# Patient Record
Sex: MALE | Race: WHITE | NOT HISPANIC OR LATINO | Employment: OTHER | URBAN - METROPOLITAN AREA
[De-identification: names, ages, dates, MRNs, and addresses within clinical notes are randomized per-mention and may not be internally consistent; named-entity substitution may affect disease eponyms.]

---

## 2019-01-22 ENCOUNTER — AMBULATORY - HEALTHEAST (OUTPATIENT)
Dept: ADMINISTRATIVE | Facility: CLINIC | Age: 74
End: 2019-01-22

## 2019-01-22 RX ORDER — ATORVASTATIN CALCIUM 40 MG/1
40 TABLET, FILM COATED ORAL DAILY
Status: SHIPPED | COMMUNITY
Start: 2021-06-27

## 2019-01-22 RX ORDER — METOPROLOL SUCCINATE 25 MG/1
12.5 TABLET, EXTENDED RELEASE ORAL DAILY
Status: SHIPPED | COMMUNITY
Start: 2019-01-22

## 2019-01-22 RX ORDER — CLOPIDOGREL BISULFATE 75 MG/1
75 TABLET ORAL DAILY
Status: SHIPPED | COMMUNITY
Start: 2019-01-22 | End: 2021-07-16

## 2019-01-22 RX ORDER — NITROGLYCERIN 0.4 MG/1
0.4 TABLET SUBLINGUAL EVERY 5 MIN PRN
Status: SHIPPED | COMMUNITY
Start: 2021-06-26

## 2019-01-23 ENCOUNTER — OFFICE VISIT - HEALTHEAST (OUTPATIENT)
Dept: GERIATRICS | Facility: CLINIC | Age: 74
End: 2019-01-23

## 2019-01-23 DIAGNOSIS — Z96.612 STATUS POST REVERSE ARTHROPLASTY OF LEFT SHOULDER: ICD-10-CM

## 2019-01-23 DIAGNOSIS — D64.9 ANEMIA, UNSPECIFIED TYPE: ICD-10-CM

## 2019-01-23 DIAGNOSIS — I25.118 CORONARY ARTERY DISEASE OF NATIVE ARTERY OF NATIVE HEART WITH STABLE ANGINA PECTORIS (H): ICD-10-CM

## 2019-01-23 DIAGNOSIS — I50.21 ACUTE SYSTOLIC CONGESTIVE HEART FAILURE (H): ICD-10-CM

## 2019-01-24 ENCOUNTER — RECORDS - HEALTHEAST (OUTPATIENT)
Dept: LAB | Facility: CLINIC | Age: 74
End: 2019-01-24

## 2019-01-25 LAB
ANION GAP SERPL CALCULATED.3IONS-SCNC: 11 MMOL/L (ref 5–18)
BUN SERPL-MCNC: 16 MG/DL (ref 8–28)
CALCIUM SERPL-MCNC: 8.9 MG/DL (ref 8.5–10.5)
CHLORIDE BLD-SCNC: 102 MMOL/L (ref 98–107)
CO2 SERPL-SCNC: 24 MMOL/L (ref 22–31)
CREAT SERPL-MCNC: 0.69 MG/DL (ref 0.7–1.3)
ERYTHROCYTE [DISTWIDTH] IN BLOOD BY AUTOMATED COUNT: 14.2 % (ref 11–14.5)
GFR SERPL CREATININE-BSD FRML MDRD: >60 ML/MIN/1.73M2
GLUCOSE BLD-MCNC: 99 MG/DL (ref 70–125)
HCT VFR BLD AUTO: 33.5 % (ref 40–54)
HGB BLD-MCNC: 10.7 G/DL (ref 14–18)
MAGNESIUM SERPL-MCNC: 2.4 MG/DL (ref 1.8–2.6)
MCH RBC QN AUTO: 32 PG (ref 27–34)
MCHC RBC AUTO-ENTMCNC: 31.9 G/DL (ref 32–36)
MCV RBC AUTO: 100 FL (ref 80–100)
PLATELET # BLD AUTO: 338 THOU/UL (ref 140–440)
PMV BLD AUTO: 9.9 FL (ref 8.5–12.5)
POTASSIUM BLD-SCNC: 4 MMOL/L (ref 3.5–5)
RBC # BLD AUTO: 3.34 MILL/UL (ref 4.4–6.2)
SODIUM SERPL-SCNC: 137 MMOL/L (ref 136–145)
WBC: 6.7 THOU/UL (ref 4–11)

## 2019-01-28 ENCOUNTER — OFFICE VISIT - HEALTHEAST (OUTPATIENT)
Dept: GERIATRICS | Facility: CLINIC | Age: 74
End: 2019-01-28

## 2019-01-28 DIAGNOSIS — R52 PAIN: ICD-10-CM

## 2019-01-28 DIAGNOSIS — I21.19 ACUTE ST ELEVATION MYOCARDIAL INFARCTION (STEMI) OF INFERIOR WALL (H): ICD-10-CM

## 2019-01-28 DIAGNOSIS — I50.21 ACUTE SYSTOLIC HEART FAILURE (H): ICD-10-CM

## 2019-01-28 DIAGNOSIS — D50.9 IRON DEFICIENCY ANEMIA, UNSPECIFIED IRON DEFICIENCY ANEMIA TYPE: ICD-10-CM

## 2019-01-28 DIAGNOSIS — Z79.01 ANTICOAGULATION ADEQUATE: ICD-10-CM

## 2019-01-28 DIAGNOSIS — I89.0 LYMPHEDEMA: ICD-10-CM

## 2019-01-28 DIAGNOSIS — S42.202D CLOSED FRACTURE OF PROXIMAL END OF LEFT HUMERUS WITH ROUTINE HEALING, UNSPECIFIED FRACTURE MORPHOLOGY, SUBSEQUENT ENCOUNTER: ICD-10-CM

## 2019-01-28 DIAGNOSIS — M06.9 RHEUMATOID ARTHRITIS INVOLVING MULTIPLE SITES, UNSPECIFIED RHEUMATOID FACTOR PRESENCE: ICD-10-CM

## 2019-02-01 ENCOUNTER — RECORDS - HEALTHEAST (OUTPATIENT)
Dept: ADMINISTRATIVE | Facility: OTHER | Age: 74
End: 2019-02-01

## 2019-02-05 ENCOUNTER — OFFICE VISIT - HEALTHEAST (OUTPATIENT)
Dept: GERIATRICS | Facility: CLINIC | Age: 74
End: 2019-02-05

## 2019-02-05 DIAGNOSIS — R52 PAIN: ICD-10-CM

## 2019-02-05 DIAGNOSIS — I50.21 ACUTE SYSTOLIC HEART FAILURE (H): ICD-10-CM

## 2019-02-05 DIAGNOSIS — Z79.01 ANTICOAGULATION ADEQUATE: ICD-10-CM

## 2019-02-05 DIAGNOSIS — I89.0 LYMPHEDEMA: ICD-10-CM

## 2019-02-05 DIAGNOSIS — I21.19 ACUTE ST ELEVATION MYOCARDIAL INFARCTION (STEMI) OF INFERIOR WALL (H): ICD-10-CM

## 2019-02-05 DIAGNOSIS — S42.202D CLOSED FRACTURE OF PROXIMAL END OF LEFT HUMERUS WITH ROUTINE HEALING, UNSPECIFIED FRACTURE MORPHOLOGY, SUBSEQUENT ENCOUNTER: ICD-10-CM

## 2019-02-05 DIAGNOSIS — M06.9 RHEUMATOID ARTHRITIS INVOLVING MULTIPLE SITES, UNSPECIFIED RHEUMATOID FACTOR PRESENCE: ICD-10-CM

## 2019-02-05 DIAGNOSIS — D50.9 IRON DEFICIENCY ANEMIA, UNSPECIFIED IRON DEFICIENCY ANEMIA TYPE: ICD-10-CM

## 2019-02-05 RX ORDER — ACETAMINOPHEN 325 MG/1
650 TABLET ORAL 4 TIMES DAILY
Status: SHIPPED | COMMUNITY
Start: 2019-02-05 | End: 2021-07-16

## 2019-02-06 ENCOUNTER — OFFICE VISIT - HEALTHEAST (OUTPATIENT)
Dept: GERIATRICS | Facility: CLINIC | Age: 74
End: 2019-02-06

## 2019-02-06 DIAGNOSIS — I89.0 LYMPHEDEMA: ICD-10-CM

## 2019-02-06 DIAGNOSIS — D50.9 IRON DEFICIENCY ANEMIA, UNSPECIFIED IRON DEFICIENCY ANEMIA TYPE: ICD-10-CM

## 2019-02-06 DIAGNOSIS — M06.9 RHEUMATOID ARTHRITIS INVOLVING MULTIPLE SITES, UNSPECIFIED RHEUMATOID FACTOR PRESENCE: ICD-10-CM

## 2019-02-06 DIAGNOSIS — I50.21 ACUTE SYSTOLIC HEART FAILURE (H): ICD-10-CM

## 2019-02-06 DIAGNOSIS — S42.202D CLOSED FRACTURE OF PROXIMAL END OF LEFT HUMERUS WITH ROUTINE HEALING, UNSPECIFIED FRACTURE MORPHOLOGY, SUBSEQUENT ENCOUNTER: ICD-10-CM

## 2019-02-08 ENCOUNTER — AMBULATORY - HEALTHEAST (OUTPATIENT)
Dept: GERIATRICS | Facility: CLINIC | Age: 74
End: 2019-02-08

## 2019-12-11 ENCOUNTER — OFFICE VISIT - HEALTHEAST (OUTPATIENT)
Dept: GERIATRICS | Facility: CLINIC | Age: 74
End: 2019-12-11

## 2019-12-11 DIAGNOSIS — I89.0 LYMPHEDEMA: ICD-10-CM

## 2019-12-11 DIAGNOSIS — M06.9 RHEUMATOID ARTHRITIS INVOLVING MULTIPLE SITES, UNSPECIFIED RHEUMATOID FACTOR PRESENCE: ICD-10-CM

## 2019-12-11 DIAGNOSIS — D50.9 IRON DEFICIENCY ANEMIA, UNSPECIFIED IRON DEFICIENCY ANEMIA TYPE: ICD-10-CM

## 2019-12-11 DIAGNOSIS — I50.21 ACUTE SYSTOLIC HEART FAILURE (H): ICD-10-CM

## 2019-12-11 DIAGNOSIS — S72.001D CLOSED FRACTURE OF NECK OF RIGHT FEMUR WITH ROUTINE HEALING, SUBSEQUENT ENCOUNTER: ICD-10-CM

## 2019-12-12 ENCOUNTER — AMBULATORY - HEALTHEAST (OUTPATIENT)
Dept: ADMINISTRATIVE | Facility: CLINIC | Age: 74
End: 2019-12-12

## 2019-12-12 RX ORDER — OXYCODONE HYDROCHLORIDE 5 MG/1
2.5 TABLET ORAL EVERY 6 HOURS PRN
Status: SHIPPED | COMMUNITY
Start: 2019-12-12 | End: 2021-07-16

## 2019-12-13 ENCOUNTER — OFFICE VISIT - HEALTHEAST (OUTPATIENT)
Dept: GERIATRICS | Facility: CLINIC | Age: 74
End: 2019-12-13

## 2019-12-13 DIAGNOSIS — D50.9 IRON DEFICIENCY ANEMIA, UNSPECIFIED IRON DEFICIENCY ANEMIA TYPE: ICD-10-CM

## 2019-12-13 DIAGNOSIS — R52 PAIN: ICD-10-CM

## 2019-12-13 DIAGNOSIS — S72.001D CLOSED FRACTURE OF NECK OF RIGHT FEMUR WITH ROUTINE HEALING, SUBSEQUENT ENCOUNTER: ICD-10-CM

## 2019-12-13 DIAGNOSIS — I10 ESSENTIAL HYPERTENSION: ICD-10-CM

## 2019-12-13 DIAGNOSIS — Z79.01 ANTICOAGULATION ADEQUATE: ICD-10-CM

## 2019-12-13 DIAGNOSIS — F51.02 ADJUSTMENT INSOMNIA: ICD-10-CM

## 2019-12-13 RX ORDER — HYDROXYZINE PAMOATE 25 MG/1
25 CAPSULE ORAL 4 TIMES DAILY
Status: SHIPPED | COMMUNITY
Start: 2019-12-13 | End: 2021-07-16

## 2019-12-13 RX ORDER — POLYETHYLENE GLYCOL 3350 17 G/17G
17 POWDER, FOR SOLUTION ORAL DAILY
Status: SHIPPED | COMMUNITY
Start: 2019-12-13 | End: 2021-07-16

## 2019-12-13 RX ORDER — LANOLIN ALCOHOL/MO/W.PET/CERES
6 CREAM (GRAM) TOPICAL AT BEDTIME
Status: SHIPPED | COMMUNITY
Start: 2019-12-13 | End: 2021-07-16

## 2019-12-16 ENCOUNTER — RECORDS - HEALTHEAST (OUTPATIENT)
Dept: LAB | Facility: CLINIC | Age: 74
End: 2019-12-16

## 2019-12-16 ENCOUNTER — OFFICE VISIT - HEALTHEAST (OUTPATIENT)
Dept: GERIATRICS | Facility: CLINIC | Age: 74
End: 2019-12-16

## 2019-12-16 DIAGNOSIS — I89.0 LYMPHEDEMA: ICD-10-CM

## 2019-12-16 DIAGNOSIS — S72.001D CLOSED FRACTURE OF NECK OF RIGHT FEMUR WITH ROUTINE HEALING, SUBSEQUENT ENCOUNTER: ICD-10-CM

## 2019-12-16 DIAGNOSIS — M06.9 RHEUMATOID ARTHRITIS INVOLVING MULTIPLE SITES, UNSPECIFIED RHEUMATOID FACTOR PRESENCE: ICD-10-CM

## 2019-12-16 DIAGNOSIS — I10 ESSENTIAL HYPERTENSION: ICD-10-CM

## 2019-12-16 DIAGNOSIS — D50.9 IRON DEFICIENCY ANEMIA, UNSPECIFIED IRON DEFICIENCY ANEMIA TYPE: ICD-10-CM

## 2019-12-16 LAB
25(OH)D3 SERPL-MCNC: 22 NG/ML (ref 30–80)
ANION GAP SERPL CALCULATED.3IONS-SCNC: 8 MMOL/L (ref 5–18)
BUN SERPL-MCNC: 21 MG/DL (ref 8–28)
CALCIUM SERPL-MCNC: 8.6 MG/DL (ref 8.5–10.5)
CHLORIDE BLD-SCNC: 106 MMOL/L (ref 98–107)
CO2 SERPL-SCNC: 25 MMOL/L (ref 22–31)
CREAT SERPL-MCNC: 0.71 MG/DL (ref 0.7–1.3)
ERYTHROCYTE [DISTWIDTH] IN BLOOD BY AUTOMATED COUNT: 13.5 % (ref 11–14.5)
GFR SERPL CREATININE-BSD FRML MDRD: >60 ML/MIN/1.73M2
GLUCOSE BLD-MCNC: 101 MG/DL (ref 70–125)
HCT VFR BLD AUTO: 32.6 % (ref 40–54)
HGB BLD-MCNC: 10.3 G/DL (ref 14–18)
MAGNESIUM SERPL-MCNC: 1.9 MG/DL (ref 1.8–2.6)
MCH RBC QN AUTO: 31.2 PG (ref 27–34)
MCHC RBC AUTO-ENTMCNC: 31.6 G/DL (ref 32–36)
MCV RBC AUTO: 99 FL (ref 80–100)
PLATELET # BLD AUTO: 323 THOU/UL (ref 140–440)
PMV BLD AUTO: 9.7 FL (ref 8.5–12.5)
POTASSIUM BLD-SCNC: 4.4 MMOL/L (ref 3.5–5)
RBC # BLD AUTO: 3.3 MILL/UL (ref 4.4–6.2)
SODIUM SERPL-SCNC: 139 MMOL/L (ref 136–145)
TSH SERPL DL<=0.005 MIU/L-ACNC: 2.28 UIU/ML (ref 0.3–5)
WBC: 6 THOU/UL (ref 4–11)

## 2019-12-18 ENCOUNTER — RECORDS - HEALTHEAST (OUTPATIENT)
Dept: LAB | Facility: CLINIC | Age: 74
End: 2019-12-18

## 2019-12-18 ENCOUNTER — OFFICE VISIT - HEALTHEAST (OUTPATIENT)
Dept: GERIATRICS | Facility: CLINIC | Age: 74
End: 2019-12-18

## 2019-12-18 DIAGNOSIS — S72.001D CLOSED FRACTURE OF NECK OF RIGHT FEMUR WITH ROUTINE HEALING, SUBSEQUENT ENCOUNTER: ICD-10-CM

## 2019-12-18 DIAGNOSIS — F51.02 ADJUSTMENT INSOMNIA: ICD-10-CM

## 2019-12-18 DIAGNOSIS — D50.9 IRON DEFICIENCY ANEMIA, UNSPECIFIED IRON DEFICIENCY ANEMIA TYPE: ICD-10-CM

## 2019-12-18 DIAGNOSIS — Z79.01 ANTICOAGULATION ADEQUATE: ICD-10-CM

## 2019-12-18 DIAGNOSIS — I10 ESSENTIAL HYPERTENSION: ICD-10-CM

## 2019-12-18 DIAGNOSIS — M06.9 RHEUMATOID ARTHRITIS INVOLVING MULTIPLE SITES, UNSPECIFIED RHEUMATOID FACTOR PRESENCE: ICD-10-CM

## 2019-12-18 DIAGNOSIS — R52 PAIN: ICD-10-CM

## 2019-12-18 LAB
ANION GAP SERPL CALCULATED.3IONS-SCNC: 7 MMOL/L (ref 5–18)
BUN SERPL-MCNC: 16 MG/DL (ref 8–28)
CALCIUM SERPL-MCNC: 9.5 MG/DL (ref 8.5–10.5)
CHLORIDE BLD-SCNC: 101 MMOL/L (ref 98–107)
CO2 SERPL-SCNC: 29 MMOL/L (ref 22–31)
CREAT SERPL-MCNC: 0.73 MG/DL (ref 0.7–1.3)
ERYTHROCYTE [DISTWIDTH] IN BLOOD BY AUTOMATED COUNT: 13.6 % (ref 11–14.5)
GFR SERPL CREATININE-BSD FRML MDRD: >60 ML/MIN/1.73M2
GLUCOSE BLD-MCNC: 96 MG/DL (ref 70–125)
HCT VFR BLD AUTO: 34 % (ref 40–54)
HGB BLD-MCNC: 10.7 G/DL (ref 14–18)
MAGNESIUM SERPL-MCNC: 2 MG/DL (ref 1.8–2.6)
MCH RBC QN AUTO: 31 PG (ref 27–34)
MCHC RBC AUTO-ENTMCNC: 31.5 G/DL (ref 32–36)
MCV RBC AUTO: 99 FL (ref 80–100)
PLATELET # BLD AUTO: 345 THOU/UL (ref 140–440)
PMV BLD AUTO: 9.7 FL (ref 8.5–12.5)
POTASSIUM BLD-SCNC: 4.1 MMOL/L (ref 3.5–5)
RBC # BLD AUTO: 3.45 MILL/UL (ref 4.4–6.2)
SODIUM SERPL-SCNC: 137 MMOL/L (ref 136–145)
WBC: 6.2 THOU/UL (ref 4–11)

## 2019-12-23 ENCOUNTER — OFFICE VISIT - HEALTHEAST (OUTPATIENT)
Dept: GERIATRICS | Facility: CLINIC | Age: 74
End: 2019-12-23

## 2019-12-23 DIAGNOSIS — I89.0 LYMPHEDEMA: ICD-10-CM

## 2019-12-23 DIAGNOSIS — D50.9 IRON DEFICIENCY ANEMIA, UNSPECIFIED IRON DEFICIENCY ANEMIA TYPE: ICD-10-CM

## 2019-12-23 DIAGNOSIS — S72.001D CLOSED FRACTURE OF NECK OF RIGHT FEMUR WITH ROUTINE HEALING, SUBSEQUENT ENCOUNTER: ICD-10-CM

## 2019-12-23 DIAGNOSIS — I10 ESSENTIAL HYPERTENSION: ICD-10-CM

## 2019-12-23 DIAGNOSIS — R52 PAIN: ICD-10-CM

## 2019-12-26 ENCOUNTER — OFFICE VISIT - HEALTHEAST (OUTPATIENT)
Dept: GERIATRICS | Facility: CLINIC | Age: 74
End: 2019-12-26

## 2019-12-26 DIAGNOSIS — F51.02 ADJUSTMENT INSOMNIA: ICD-10-CM

## 2019-12-26 DIAGNOSIS — S72.001D CLOSED FRACTURE OF NECK OF RIGHT FEMUR WITH ROUTINE HEALING, SUBSEQUENT ENCOUNTER: ICD-10-CM

## 2019-12-26 DIAGNOSIS — I10 ESSENTIAL HYPERTENSION: ICD-10-CM

## 2019-12-26 DIAGNOSIS — R52 PAIN: ICD-10-CM

## 2019-12-26 DIAGNOSIS — Z79.01 ANTICOAGULATION ADEQUATE: ICD-10-CM

## 2019-12-26 RX ORDER — ACETAMINOPHEN 500 MG
1000 TABLET ORAL EVERY 6 HOURS PRN
Status: SHIPPED | COMMUNITY
Start: 2021-06-26

## 2019-12-30 ENCOUNTER — AMBULATORY - HEALTHEAST (OUTPATIENT)
Dept: GERIATRICS | Facility: CLINIC | Age: 74
End: 2019-12-30

## 2020-03-12 ENCOUNTER — HOSPITAL ENCOUNTER (OUTPATIENT)
Dept: PHYSICAL THERAPY | Facility: CLINIC | Age: 75
End: 2020-03-12
Payer: MEDICARE

## 2020-03-12 DIAGNOSIS — R26.9 ABNORMALITY OF GAIT AND MOBILITY: ICD-10-CM

## 2020-03-12 DIAGNOSIS — M40.00 ACQUIRED POSTURAL KYPHOSIS: ICD-10-CM

## 2020-03-12 DIAGNOSIS — R26.89 BALANCE PROBLEMS: ICD-10-CM

## 2020-03-12 DIAGNOSIS — G20.A1 PARKINSON DISEASE (H): Primary | ICD-10-CM

## 2020-03-12 PROCEDURE — 97161 PT EVAL LOW COMPLEX 20 MIN: CPT | Mod: GP | Performed by: PHYSICAL THERAPIST

## 2020-03-12 ASSESSMENT — 6 MINUTE WALK TEST (6MWT)
TOTAL DISTANCE WALKED (FT): 768
COMMENTS: WITH SPC

## 2020-03-12 NOTE — PROGRESS NOTES
[facility]        OUTPATIENT PHYSICAL THERAPY FUNCTIONAL EVALUATION  PLAN OF TREATMENT FOR OUTPATIENT REHABILITATION  (COMPLETE FOR INITIAL CLAIMS ONLY)  Patient's Last Name, First Name, M.I.  YOB: 1945  Hugh Berman     Provider's Name   [facility]   Medical Record No.  [id number]     Start of Care Date:  03/12/20   Onset Date:  03/04/20(Referral date used)   Type:     _X__PT   ____OT  ____SLP Medical Diagnosis:  Parkinson's disease G20     PT Diagnosis:  Impaired functional mobility secondary to Parkinson's Disease Visits from SOC:  1                              __________________________________________________________________________________  Plan of Treatment/Functional Goals:  balance training, bed mobility training, gait training, neuromuscular re-education, ROM, strengthening, stretching, transfer training(LSVT BIG program)           GOALS  HEP  Patient will demonstrate understanding and compliance to his LSVT BIG HEP for continued wellbeing upon discharge from skilled physical therapy.  06/04/20    TUG  Patient will complete the TUG in less than 15 seconds to demonstrate improved safety and independence with transfers, turns, and gait.  06/04/20    6 MWT  Patient will ambulate 1000 feet during the 6 MWT with least restrictive AD to demonstrate improved activity tolerance for independent and safe community ambulation.  06/04/20    25 gait speed  Patient will ambulate 25 feet in <7.5 seconds to demonstrate improved step length and gait speed for increased safety and independence with crossing the street.  06/04/20    Transfers  Patient will complete 6 BIG STS transfers with no UE assist from an 18 inch surface in 30 seconds to demonstrate improved LE strength for independent transfers from low surfaces.  06/04/20    Uneven surfaces  Patient will  demonstrate ability to negotiate even and uneven surfaces with good balance recovery and safety awareness for increased independence and safety with getting  in a park in May 2020.  06/04/20                          Therapy Frequency:  other (see comments)(4x per week as able)   Predicted Duration of Therapy Intervention:  12 weeks for scheduling delay    Jami Higginbotham, PT                                    I CERTIFY THE NEED FOR THESE SERVICES FURNISHED UNDER        THIS PLAN OF TREATMENT AND WHILE UNDER MY CARE .             Physician Signature               Date    X_____________________________________________________                      Certification Date From:  03/12/20   Certification Date To:  06/04/20    Referring Provider:  Rik Ball, DO    Initial Assessment  See Epic Evaluation- Start of Care Date: 03/12/20

## 2020-03-12 NOTE — PROGRESS NOTES
03/12/20 1000   Quick Adds   Quick Adds Certification   Type of Visit Initial OP PT Evaluation   General Information   Start of Care Date 03/12/20   Referring Physician Rik Ball, DO   Orders Evaluate and Treat as Indicated   Additional Orders Speech LOUD   Order Date 03/04/20   Medical Diagnosis Parkinson's disease G20   Onset of illness/injury or Date of Surgery 03/04/20  (Referral date used)   Precautions/Limitations fall precautions   Special Instructions LSVT BIG and LOUD therapy with PT, OT, speech   Surgical/Medical history reviewed Yes   Pertinent history of current problem (include personal factors and/or comorbidities that impact the POC) Patient reports he was diagnosed with Parkinson's this February, has been having symptoms for years but did not recognize. Patient reports he is on medication and notes mild improvements. Patient reports he generally has trouble with transfers - chairs, car, bed. Patient notes walking is slow and he started using the cane about 4-5 months ago. Patient notes several falls in 2019 - one resulting in a left shoulder replacement in 1/19, and another resulting in a right ADELAIDA in 12/19. Patient reports he has had several near falls but walking with the cane has helped him prevent falls. Patient notes he is getting  in May 2020, moving in with her. Patient has a RW that he also uses, not as much lately. Patient notes his balance is getting worse, would like to be able to lift and help with moving.   Pertinent Visual History  Glasses   Prior level of function comment Patient previously ambulating with no AD, IND with all ADLs and functional mobility, previously had home health physical therapy and occupation therapy until the middle of February for right ADELAIDA rehab.    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 Currently has stairs but is able to negotiate with the cane, moving to a house with stairs to the bedroom, bathroom on the first floor.    Current Assistive Devices Standard Cane   Assistive Devices Comments SPC currently, also has a RW   Patient/Family Goals Statement Improve balance, mobility, walking   Fall Risk Screen   Fall screen completed by PT   Have you fallen 2 or more times in the past year? Yes   Have you fallen and had an injury in the past year? Yes   Timed Up and Go score (seconds) 27.91  (With SPC)   Is patient a fall risk? Yes;Department fall risk interventions implemented   Pain   Patient currently in pain Yes   Pain location Right hip   Pain rating 5/10   Pain description Sharp  (stabbing)   Pain comments No increase with standing or walking   Vitals Signs   Heart Rate 75   SpO2 96   Blood Pressure 123/73   Vital Signs Comments Seated, resting, left arm   Cognitive Status Examination   Orientation orientation to person, place and time   Level of Consciousness alert   Follows Commands and Answers Questions 100% of the time;able to follow multistep instructions   Personal Safety and Judgment intact   Memory intact   Integumentary   Integumentary Other   Integumentary Comments Dried blood and small wound on the right side of his nose   Posture   Posture Forward head position;Protracted shoulders   Posture Comments Downward gaze, forward trunk flexion, decreased lumbar lordosis   Range of Motion (ROM)   ROM Comment Right hip limited to 90* with sharp pain   Strength   Strength Comments MMT performed in sitting, B LE grossly 5/5   Bed Mobility   Bed Mobility Comments Able to perform sit<>supine with UE support on edge of mat, slowed and effortful    Transfer Skills   Transfer Comments Difficulty standing with significant retro lean on the chair, therapist assist with anterior weight shift 50% of the time   Gait   Gait Comments Patient ambulates with decreased gait speed and increased neyda, narrow BARBARA, forward trunk flexion, forward shoulders and downward gaze, increased diffficulty and time for turns bilaterally   Gait Special Tests   Gait Special Tests 25 FOOT TIMED WALK;SIX MINUTE WALK TEST   Gait Special Tests 25 Foot Timed Walk   Seconds 9.72   Steps 16 Steps   Comments With SPC   Gait Special Tests Six Minute Walk Test   Feet 768 Feet   Comments With SPC   Balance Special Tests   Balance Special Tests Timed up and go;Sit to stand reps;Other   Balance special tests other 360* turn each direction; L: 11 steps in 4.82 sec, R: 12 steps in 4.94 sec (improved from demonstration during 6 MWT)   Balance Special Tests Timed Up and Go   Seconds 27.91 Seconds   Comments With SPC   Balance Special Tests Sit to Stand Reps in 30 Seconds   Reps in 30 seconds 0   Height 18 inches   Comments With UE assist and significant retro lean on chair, 4x   Sensory Examination   Sensory Perception no deficits were identified   Coordination   Coordination other (see comments)   Coordination Comments Impaired ABNER in B UE and LE   Muscle Tone   Muscle Tone other (describe)   Muscle Tone Comments Unable to assess due to LE muscle guarding   Modality Interventions   Planned Modality Interventions Comments Per therapist discretion   Planned Therapy Interventions   Planned Therapy Interventions balance training;bed mobility training;gait training;neuromuscular re-education;ROM;strengthening;stretching;transfer training  (LSVT BIG program)   Clinical Impression   Criteria for Skilled Therapeutic Interventions Met yes, treatment indicated   PT Diagnosis Impaired functional mobility secondary to Parkinson's Disease   Influenced by the following impairments Impaired posture, PMH of  multiple joint replacements and repeated falls, impaired coordination, impaired ROM, impaired endurance, impaired gait   Functional limitations due to impairments Increased risk for falls per the TUG, impaired safety and independence with bed mobility, transfers, functional mobility, and ADLs   Clinical Presentation Stable/Uncomplicated   Clinical Presentation Rationale Medically stable, however complex PMH   Clinical Decision Making (Complexity) Low complexity   Therapy Frequency other (see comments)  (4x per week as able)   Predicted Duration of Therapy Intervention (days/wks) 12 weeks for scheduling delay   Risk & Benefits of therapy have been explained Yes   Patient, Family & other staff in agreement with plan of care Yes   Clinical Impression Comments Patient is a 74 year old male presenting for evaluation of the LSVT BIG program with recent diagnosis of Parkinson's Disease. Patient presents with significant limitations with transfers, bed mobility, gait speed and quality, posture, and endurance. He is also at an increased risk for falls per the TUG and has a history of multiple falls in 2019, with one resulting in a left shoulder replacement and another resulting in a right hip replacement. Patient may benefit from skilled physical therapy to address these deficits, decrease his risk for falls, and increase his safety and independence with functional mobility.    GOALS   PT Eval Goals 1;2;3;4;5;6   Goal 1   Goal Identifier HEP   Goal Description Patient will demonstrate understanding and compliance to his [email or web address] HEP for continued wellbeing upon discharge from skilled physical therapy.   Target Date 06/04/20   Goal 2   Goal Identifier TUG   Goal Description Patient will complete the TUG in less than 15 seconds to demonstrate improved safety and independence with transfers, turns, and gait.   Target Date 06/04/20   Goal 3   Goal Identifier 6 MWT   Goal Description Patient will ambulate 1000 feet during the 6  MWT with least restrictive AD to demonstrate improved activity tolerance for independent and safe community ambulation.   Target Date 06/04/20   Goal 4   Goal Identifier 25 gait speed   Goal Description Patient will ambulate 25 feet in <7.5 seconds to demonstrate improved step length and gait speed for increased safety and independence with crossing the street.   Target Date 06/04/20   Goal 5   Goal Identifier Transfers   Goal Description Patient will complete 6 BIG STS transfers with no UE assist from an 18 inch surface in 30 seconds to demonstrate improved LE strength for independent transfers from low surfaces.   Target Date 06/04/20   Goal 6   Goal Identifier Uneven surfaces   Goal Description Patient will demonstrate ability to negotiate even and uneven surfaces with good balance recovery and safety awareness for increased independence and safety with getting  in a park in May 2020.   Target Date 06/04/20   Total Evaluation Time   PT Eval, Low Complexity Minutes (17830) 50   Therapy Certification   Certification date from 03/12/20   Certification date to 06/04/20   Medical Diagnosis Parkinson's disease G20   Certification I certify the need for these services furnished under this plan of treatment and while under my care.  (Physician co-signature of this document indicates review and certification of the therapy plan).

## 2021-06-02 VITALS — WEIGHT: 200 LBS

## 2021-06-02 VITALS — WEIGHT: 208 LBS

## 2021-06-04 VITALS
RESPIRATION RATE: 16 BRPM | WEIGHT: 180 LBS | TEMPERATURE: 98 F | OXYGEN SATURATION: 97 % | DIASTOLIC BLOOD PRESSURE: 62 MMHG | HEART RATE: 66 BPM | SYSTOLIC BLOOD PRESSURE: 99 MMHG

## 2021-06-04 VITALS
WEIGHT: 181 LBS | DIASTOLIC BLOOD PRESSURE: 63 MMHG | SYSTOLIC BLOOD PRESSURE: 98 MMHG | RESPIRATION RATE: 16 BRPM | TEMPERATURE: 97 F | OXYGEN SATURATION: 94 % | HEART RATE: 60 BPM

## 2021-06-04 VITALS
SYSTOLIC BLOOD PRESSURE: 105 MMHG | RESPIRATION RATE: 17 BRPM | HEART RATE: 68 BPM | OXYGEN SATURATION: 94 % | TEMPERATURE: 98 F | DIASTOLIC BLOOD PRESSURE: 67 MMHG | WEIGHT: 179 LBS

## 2021-06-04 NOTE — PROGRESS NOTES
[facility]      Facility:    [facility] [[id number]]  Code Status: POLST AVAILABLE      Chief Complaint/Reason for Visit:   Chief Complaint   Patient presents with     Review Of Multiple Medical Conditions       HPI:   Hugh is a 74 y.o. male who is recent transfer from [facility] admission on 12/7/2019 and discharged on 12/11/2019.  He has a past medical history of RA and MI after his shoulder surgery, hypertension who was admitted for a new right femoral neck fracture.  Apparently he lost his balance and fell at high V landing on his right hip.  Imaging showed right femoral neck fracture, underwent hemo-arthroplasty of right hip on 12/8/2019 for closed displaced fracture of right femoral neck, also pathology tissue exam was ordered per fragility fracture.  Apparently he has history of spinal stenosis, underwent head CT, unremarkable.  He was given weightbearing as tolerated, advised to start Forteo or biphosphonate's with an addition of calcium citrate and vitamin D.  He is also on aspirin and Plavix for CAD management, Tylenol and oxycodone for pain control. His last Hgb 10.1.  He was then discharged to TCU for rehab.    Today will assess pain control, constipation issues and therapy progress.    Past Medical History:  Past Medical History:   Diagnosis Date     CAD (coronary artery disease)      Carpal tunnel syndrome, bilateral      Closed fracture of distal end of left humerus      Closed fracture of neck of right femur (H)      Dislocation of left shoulder joint      DM2 (diabetes mellitus, type 2) (H)      Elevated C-reactive protein      Hiatal hernia      Internal derangement of left shoulder      Lower extremity edema      Prediabetes      RA (rheumatoid arthritis) (H)      STEMI (ST elevation myocardial infarction) (H)      Thrombophlebitis            Surgical History:  Past Surgical History:   Procedure Laterality Date     CORONARY STENT PLACEMENT   2019     HEMIARTHROPLASTY HIP Right 2019     REVERSE TOTAL SHOULDER ARTHROPLASTY Left 2019     WISDOM TOOTH EXTRACTION  1966       Family History:   No family history on file.    Social History:    Social History     Socioeconomic History     Marital status: Single     Spouse name: Not on file     Number of children: Not on file     Years of education: Not on file     Highest education level: Not on file   Occupational History     Not on file   Social Needs     Financial resource strain: Not on file     Food insecurity:     Worry: Not on file     Inability: Not on file     Transportation needs:     Medical: Not on file     Non-medical: Not on file   Tobacco Use     Smoking status: Former Smoker     Packs/day: 0.10     Years: 35.00     Pack years: 3.50     Types: Cigarettes     Last attempt to quit: 2019     Years since quittin.9     Smokeless tobacco: Never Used   Substance and Sexual Activity     Alcohol use: No     Drug use: Not on file     Sexual activity: Not on file   Lifestyle     Physical activity:     Days per week: Not on file     Minutes per session: Not on file     Stress: Not on file   Relationships     Social connections:     Talks on phone: Not on file     Gets together: Not on file     Attends [employer] service: Not on file     Active member of club or organization: Not on file     Attends meetings of clubs or organizations: Not on file     Relationship status: Not on file     Intimate partner violence:     Fear of current or ex partner: Not on file     Emotionally abused: Not on file     Physically abused: Not on file     Forced sexual activity: Not on file   Other Topics Concern     Not on file   Social History Narrative     Not on file          Review of Systems   Constitutional: Positive for activity change. Negative for appetite change, chills, diaphoresis, fatigue and fever.        No concerns   HENT: Negative for congestion and hearing loss.    Eyes: Negative.     Respiratory: Negative for shortness of breath and wheezing.    Cardiovascular: Negative.    Gastrointestinal: Negative for abdominal distention, abdominal pain, constipation, diarrhea and nausea.   Endocrine: Negative.    Genitourinary: Negative for difficulty urinating.   Musculoskeletal:        R LE pain improved   Skin: Positive for wound.        R hip   Allergic/Immunologic: Negative.    Neurological: Negative for dizziness, tremors and light-headedness.   Hematological: Negative.    Psychiatric/Behavioral: Positive for sleep disturbance. Negative for agitation, confusion and hallucinations.       Vitals:    12/18/19 1926   BP: 99/62   Pulse: 66   Resp: 16   Temp: 98  F (36.7  C)   SpO2: 97%   Weight: 180 lb (81.6 kg)       Physical Exam  Vitals signs reviewed.   Constitutional:       Comments: Pain control improved, has insomnia   HENT:      Head: Normocephalic and atraumatic.      Right Ear: External ear normal.      Left Ear: External ear normal.      Nose: No congestion or rhinorrhea.      Mouth/Throat:      Mouth: Mucous membranes are moist.      Pharynx: No oropharyngeal exudate or posterior oropharyngeal erythema.   Eyes:      General:         Right eye: No discharge.         Left eye: No discharge.   Neck:      Musculoskeletal: Normal range of motion and neck supple.      Comments: Intact  Cardiovascular:      Rate and Rhythm: Normal rate.      Heart sounds: No murmur. No friction rub. No gallop.    Pulmonary:      Effort: No respiratory distress.      Breath sounds: Normal breath sounds. No wheezing or rales.      Comments: CTA, room air  Abdominal:      General: Bowel sounds are normal. There is no distension.      Palpations: Abdomen is soft.      Tenderness: There is no abdominal tenderness. There is no guarding.      Comments: Has constipation   Genitourinary:     Comments: No limitations  Musculoskeletal:         General: No swelling.      Right lower leg: No edema.      Left lower leg: No edema.       Comments: R hip pain more controlled, using walker   Skin:     General: Skin is warm and dry.      Comments: Old drainage on Aquacel dressing, intact   Neurological:      Mental Status: He is alert and oriented to person, place, and time.   Psychiatric:         Mood and Affect: Mood normal.      Comments: Denies anxiety or depression         Medication List:  Current Outpatient Medications   Medication Sig     acetaminophen (TYLENOL) 325 MG tablet Take 650 mg by mouth 4 (four) times a day.      aspirin 81 MG EC tablet Take 81 mg by mouth daily.     atorvastatin (LIPITOR) 40 MG tablet Take 40 mg by mouth at bedtime.     CALCIUM CITRATE ORAL Take 600 mg by mouth 2 (two) times a day.     cholecalciferol, vitamin D3, 5,000 unit Tab Take 1 tablet by mouth daily.     clopidogrel (PLAVIX) 75 mg tablet Take 75 mg by mouth daily.     hydrOXYzine pamoate (VISTARIL) 25 MG capsule Take 25 mg by mouth 4 (four) times a day. Give with tylenol     melatonin 3 mg Tab tablet Take 6 mg by mouth at bedtime.     metoprolol succinate (TOPROL-XL) 25 MG Take 12.5 mg by mouth daily.            nitroglycerin (NITROSTAT) 0.4 MG SL tablet Place 0.4 mg under the tongue every 5 (five) minutes as needed for chest pain.     oxyCODONE (ROXICODONE) 5 MG immediate release tablet Take 5 mg by mouth every 4 (four) hours as needed for pain.     polyethylene glycol (MIRALAX) 17 gram packet Take 17 g by mouth daily.       Labs:  Results for orders placed or performed in visit on 12/18/19   Basic Metabolic Panel   Result Value Ref Range    Sodium 137 136 - 145 mmol/L    Potassium 4.1 3.5 - 5.0 mmol/L    Chloride 101 98 - 107 mmol/L    CO2 29 22 - 31 mmol/L    Anion Gap, Calculation 7 5 - 18 mmol/L    Glucose 96 70 - 125 mg/dL    Calcium 9.5 8.5 - 10.5 mg/dL    BUN 16 8 - 28 mg/dL    Creatinine 0.73 0.70 - 1.30 mg/dL    GFR MDRD Af Amer >60 >60 mL/min/1.73m2    GFR MDRD Non Af Amer >60 >60 mL/min/1.73m2     Lab Results   Component Value Date    WBC 6.2  12/18/2019    HGB 10.7 (L) 12/18/2019    HCT 34.0 (L) 12/18/2019    MCV 99 12/18/2019     12/18/2019     Vitamin D, Total (25-Hydroxy)   Date Value Ref Range Status   12/16/2019 22.0 (L) 30.0 - 80.0 ng/mL Final     Lab Results   Component Value Date    TSH 2.28 12/16/2019     A1c 5.5      Assessment/Plan:    Fragility fracture resultant right femoral neck fracture, underwent hemo-arthroplasty of right hip on 12/8/2019: WBAT, monitor aquacel drsg, surgeon suggested calcium citrate 600 mg twice daily, D3 5000 units daily and treatment for osteoporosis to be addressed by PCP (biphosphonate or Forteo)    ABLA: last Hgb 10.7 on 12/18    Pain control: continue Tylenol 650 4 times daily with Vistaril 4 times daily and aggressive icing.  Continue oxycodone 5 mg every 4 hours as needed (using 2-3x daily    DVT prophylaxis with history of CAD: Continue aspirin 81 mg, Plavix daily and atorvastatin 40mg daily    HTN: continue metoprolol succinate 25mg daily, SBP <120    Insomnia: continue melatonin 6 mg at bedtime, some improvment    Constipation: Started MiraLAX daily, effective    Disposition: Plans to return to home. Plans to leave next week after working on stairs.  Pending cognitive assessment      Electronically signed by: Erik Le NP

## 2021-06-04 NOTE — PROGRESS NOTES
[facility]      Facility:    [facility] [[id number]]  Code Status: POLST AVAILABLE      Chief Complaint/Reason for Visit:   Chief Complaint   Patient presents with     Review Of Multiple Medical Conditions       HPI:   Hugh is a 74 y.o. male who is recent transfer from [facility] admission on 12/7/2019 and discharged on 12/11/2019.  He has a past medical history of RA and MI after his shoulder surgery, hypertension who was admitted for a new right femoral neck fracture.  Apparently he lost his balance and fell at high V landing on his right hip.  Imaging showed right femoral neck fracture, underwent hemo-arthroplasty of right hip on 12/8/2019 for closed displaced fracture of right femoral neck, also pathology tissue exam was ordered per fragility fracture.  Apparently he has history of spinal stenosis, underwent head CT, unremarkable.  He was given weightbearing as tolerated, advised to start Forteo or biphosphonate's with an addition of calcium citrate and vitamin D.  He is also on aspirin and Plavix for CAD management, Tylenol and oxycodone for pain control. His last Hgb 10.1.  He was then discharged to TCU for rehab.    Past Medical History:  Past Medical History:   Diagnosis Date     CAD (coronary artery disease)      Carpal tunnel syndrome, bilateral      Closed fracture of distal end of left humerus      Closed fracture of neck of right femur (H)      Dislocation of left shoulder joint      DM2 (diabetes mellitus, type 2) (H)      Elevated C-reactive protein      Hiatal hernia      Internal derangement of left shoulder      Lower extremity edema      Prediabetes      RA (rheumatoid arthritis) (H)      STEMI (ST elevation myocardial infarction) (H)      Thrombophlebitis            Surgical History:  Past Surgical History:   Procedure Laterality Date     CORONARY STENT PLACEMENT  01/16/2019     HEMIARTHROPLASTY HIP Right 12/08/2019     REVERSE TOTAL  SHOULDER ARTHROPLASTY Left 2019     WISDOM TOOTH EXTRACTION  1966       Family History:   No family history on file.    Social History:    Social History     Socioeconomic History     Marital status: Single     Spouse name: Not on file     Number of children: Not on file     Years of education: Not on file     Highest education level: Not on file   Occupational History     Not on file   Social Needs     Financial resource strain: Not on file     Food insecurity:     Worry: Not on file     Inability: Not on file     Transportation needs:     Medical: Not on file     Non-medical: Not on file   Tobacco Use     Smoking status: Former Smoker     Packs/day: 0.10     Years: 35.00     Pack years: 3.50     Types: Cigarettes     Last attempt to quit: 2019     Years since quittin.9     Smokeless tobacco: Never Used   Substance and Sexual Activity     Alcohol use: No     Drug use: Not on file     Sexual activity: Not on file   Lifestyle     Physical activity:     Days per week: Not on file     Minutes per session: Not on file     Stress: Not on file   Relationships     Social connections:     Talks on phone: Not on file     Gets together: Not on file     Attends Jain service: Not on file     Active member of club or organization: Not on file     Attends meetings of clubs or organizations: Not on file     Relationship status: Not on file     Intimate partner violence:     Fear of current or ex partner: Not on file     Emotionally abused: Not on file     Physically abused: Not on file     Forced sexual activity: Not on file   Other Topics Concern     Not on file   Social History Narrative     Not on file          Review of Systems   Constitutional: Positive for activity change. Negative for appetite change, chills, diaphoresis, fatigue and fever.        Pain control   HENT: Negative for congestion and hearing loss.    Eyes: Negative.    Respiratory: Negative for shortness of breath and wheezing.     Cardiovascular: Negative.    Gastrointestinal: Positive for constipation. Negative for abdominal distention, abdominal pain, diarrhea and nausea.   Endocrine: Negative.    Genitourinary: Negative for difficulty urinating.   Musculoskeletal:        R LE pain   Skin: Positive for wound.        R hip   Allergic/Immunologic: Negative.    Neurological: Negative for dizziness, tremors and light-headedness.   Hematological: Negative.    Psychiatric/Behavioral: Positive for sleep disturbance. Negative for agitation, confusion and hallucinations.       Vitals:    12/13/19 1254   BP: 98/63   Pulse: 60   Resp: 16   Temp: 97  F (36.1  C)   SpO2: 94%   Weight: 181 lb (82.1 kg)       Physical Exam  Vitals signs reviewed.   Constitutional:       Comments: Pain control   HENT:      Head: Normocephalic and atraumatic.      Right Ear: External ear normal.      Left Ear: External ear normal.      Nose: No congestion or rhinorrhea.      Mouth/Throat:      Mouth: Mucous membranes are moist.      Pharynx: No oropharyngeal exudate or posterior oropharyngeal erythema.   Eyes:      General:         Right eye: No discharge.         Left eye: No discharge.   Neck:      Musculoskeletal: Normal range of motion and neck supple.      Comments: Intact  Cardiovascular:      Rate and Rhythm: Normal rate.      Heart sounds: No murmur. No friction rub. No gallop.    Pulmonary:      Effort: No respiratory distress.      Breath sounds: Normal breath sounds. No wheezing or rales.      Comments: CTA, room air  Abdominal:      General: Bowel sounds are normal. There is no distension.      Palpations: Abdomen is soft.      Tenderness: There is no abdominal tenderness. There is no guarding.      Comments: Has constipation   Genitourinary:     Comments: No limitations  Musculoskeletal:         General: Swelling present.      Right lower leg: No edema.      Left lower leg: No edema.      Comments: Localized edema to right hip, pain associated, weightbearing  as tolerated   Skin:     General: Skin is warm and dry.      Comments: Old drainage on Aquacel dressing, intact   Neurological:      Mental Status: He is alert and oriented to person, place, and time.   Psychiatric:         Mood and Affect: Mood normal.      Comments: Denies anxiety or depression         Medication List:  Current Outpatient Medications   Medication Sig     CALCIUM CITRATE ORAL Take 600 mg by mouth 2 (two) times a day.     cholecalciferol, vitamin D3, 5,000 unit Tab Take 1 tablet by mouth daily.     hydrOXYzine pamoate (VISTARIL) 25 MG capsule Take 25 mg by mouth 4 (four) times a day. Give with tylenol     melatonin 3 mg Tab tablet Take 6 mg by mouth at bedtime.     polyethylene glycol (MIRALAX) 17 gram packet Take 17 g by mouth daily.     acetaminophen (TYLENOL) 325 MG tablet Take 650 mg by mouth 4 (four) times a day.      aspirin 81 MG EC tablet Take 81 mg by mouth daily.     atorvastatin (LIPITOR) 40 MG tablet Take 40 mg by mouth at bedtime.     clopidogrel (PLAVIX) 75 mg tablet Take 75 mg by mouth daily.     metoprolol succinate (TOPROL-XL) 25 MG Take 12.5 mg by mouth daily.            nitroglycerin (NITROSTAT) 0.4 MG SL tablet Place 0.4 mg under the tongue every 5 (five) minutes as needed for chest pain.     oxyCODONE (ROXICODONE) 5 MG immediate release tablet Take 5 mg by mouth every 4 (four) hours as needed for pain.       Labs:  Na 140  K 4.1  Cr 0.63  GFR >60  WBC 7.7  Hgb 10.1  MCV 98  A1c 5.5      Assessment/Plan:    Fragility fracture resultant right femoral neck fracture, underwent hemo-arthroplasty of right hip on 12/8/2019: WBAT, monitor aquacel drsg, surgeon suggested calcium citrate 600 mg twice daily, D3 5000 units daily and treatment for osteoporosis to be addressed by PCP (biphosphonate or Forteo)    ABLA: last Hgb 10.1, recheck CBC    Pain control: Increase Tylenol to 650 4 times daily with Vistaril 4 times daily and aggressive icing.  Continue oxycodone 5 mg every 4 hours as  needed    DVT prophylaxis with history of CAD: Continue aspirin 81 mg, Plavix daily and atorvastatin 40mg daily    HTN: continue metoprolol succinate 25mg daily, SBP <120    Insomnia: Start melatonin 6 mg at bedtime    Constipation: Start MiraLAX daily    Labs: Recheck BMP/CBC/mag/TSH/vitamin D    Disposition: Plans to return to home. Plans to leave next week after working on stairs.  Pending cognitive assessment    The care plan has been reviewed and all orders signed. Changes to care plan, if any, as noted. Otherwise, continue care plan of care.  The total time spent with this patient 35 minutes, with greater than 50% spent in counseling and coordination of care that included multiple issues    Post Discharge Medication Reconciliation Status: discharge medications reconciled and changed, per note/orders (see AVS)      Electronically signed by: Erik Le NP

## 2021-06-04 NOTE — PROGRESS NOTES
[facility] Admission note      Patient: Hugh Berman Jr.  MRN: [id number]  Date of Service:  12/16/19      [facility] [[id number]]  Reason for Visit     Chief Complaint   Patient presents with     Review Of Multiple Medical Conditions       Code Status     FULL CODE    Assessment     -History of a mechanical fall with resultant right femur fracture status post ORIF;  right hip hemiarthroplasty on 12/8/2019  -Fragility fracture with underlying history of osteoporosis suspected  -Pain management  -Underlying history of ischemic cardiomyopathy with severe coronary artery disease recently was in the hospital and underwent stenting with angioplasty in January 2019  -History of rheumatoid arthritis currently has been weaned off all his DMARD agent as per patient  -Chronic lymphedema bilateral lower extremities  -History of prediabetes  - ABLA  -History of recurrent falls with patient reporting 4 falls in 2 weeks    Plan     Patient has been admitted to the TCU.  WBAT  Continue with his incisional cares and follow-up with orthopedics as scheduled  Continue PT/OT  -As he has had a fragility fracture it has been recommended he goes for outpatient DEXA scan and osteoporosis treatment once he is discharged in the TCU this was reviewed with him  Empirically start him on vitamin D 2000 units daily  Due to increased pain concerns incision was examined and seems to be intact with no drainage or dehiscence noted so patient was reassured advised aggressive icing and continue with his pain regimen.  He is ambulating with therapy using a walker and a cane but remains debilitated at baseline  Recheck labs from today were all reviewed.  All within normal limits with a hemoglobin of 10.3 and other electrolytes and kidney functions.  Patient does have a low vitamin D level but he has been already empirically started on 5000 units daily on admission so continue with the same with no change  Continue  with his PT OT and rehab    History     Patient is a very pleasant 74 y.o. male who is admitted to TCU  Patient presents to the hospital after a fall this was felt to be purely mechanical fall as per the patient.  Unfortunately he has significant and profound gait instability as per him and has been falling more he fell 4 times in 2 weeks prior to presenting to the hospital.  Gait remains unstable and he is using a walker and a cane for ambulation but requires assistance  Imaging revealed he had a right femur fracture for which she was consulted with orthopedics.  They recommended surgical repair.  He has been discharged weightbearing as tolerated  He was reporting more pain and drainage at the incision site which was examined staples are intact with no drainage noted and patient was reassured  Pain management reviewed with him he has been discharged on Tylenol as needed and oxycodone as needed he is reporting some pain with movement but overall he feels his pain is adequately controlled.  He does report more pain post therapy and was advised aggressive icing continue with his pain regimen  He does have underlying history of ischemic cardiomyopathy and recently was in the hospital where he had stenting done.  He remains on aspirin and Plavix this will be continued for DVT prophylaxis also  Recheck labs came back and are all stable recheck hemoglobin is 10.3 electrolytes are stable kidney functions are normal      Past Medical History     Active Ambulatory (Non-Hospital) Problems    Diagnosis     Fracture of neck of right femur (H)     Essential hypertension     Adjustment insomnia     Closed fracture of proximal end of left humerus with routine healing     Acute ST elevation myocardial infarction (STEMI) of inferior wall (H)     Anticoagulation adequate     Lymphedema     Anemia     Acute systolic heart failure (H)     Pain     Rheumatoid arthritis involving multiple sites (H)     Past Medical History:   Diagnosis  Date     CAD (coronary artery disease)      Carpal tunnel syndrome, bilateral      Closed fracture of distal end of left humerus      Closed fracture of neck of right femur (H)      Dislocation of left shoulder joint      DM2 (diabetes mellitus, type 2) (H)      Elevated C-reactive protein      Hiatal hernia      Internal derangement of left shoulder      Lower extremity edema      Prediabetes      RA (rheumatoid arthritis) (H)      STEMI (ST elevation myocardial infarction) (H)      Thrombophlebitis        Past Social History     Reviewed, and he  reports that he quit smoking about 10 months ago. His smoking use included cigarettes. He has a 3.50 pack-year smoking history. He has never used smokeless tobacco. He reports that he does not drink alcohol.    Family History     Reviewed, and includes a history of CVA in his grandfather    Medication List   Post Discharge Medication Reconciliation Status: discharge medications reconciled and changed, per note/orders (see AVS)   Current Outpatient Medications on File Prior to Visit   Medication Sig Dispense Refill     acetaminophen (TYLENOL) 325 MG tablet Take 650 mg by mouth 4 (four) times a day.        aspirin 81 MG EC tablet Take 81 mg by mouth daily.       atorvastatin (LIPITOR) 40 MG tablet Take 40 mg by mouth at bedtime.       CALCIUM CITRATE ORAL Take 600 mg by mouth 2 (two) times a day.       cholecalciferol, vitamin D3, 5,000 unit Tab Take 1 tablet by mouth daily.       clopidogrel (PLAVIX) 75 mg tablet Take 75 mg by mouth daily.       hydrOXYzine pamoate (VISTARIL) 25 MG capsule Take 25 mg by mouth 4 (four) times a day. Give with tylenol       melatonin 3 mg Tab tablet Take 6 mg by mouth at bedtime.       metoprolol succinate (TOPROL-XL) 25 MG Take 12.5 mg by mouth daily.              nitroglycerin (NITROSTAT) 0.4 MG SL tablet Place 0.4 mg under the tongue every 5 (five) minutes as needed for chest pain.       oxyCODONE (ROXICODONE) 5 MG immediate release tablet  Take 5 mg by mouth every 4 (four) hours as needed for pain.       polyethylene glycol (MIRALAX) 17 gram packet Take 17 g by mouth daily.       No current facility-administered medications on file prior to visit.        Allergies     Allergies   Allergen Reactions     Penicillins Hives     Sulfa (Sulfonamide Antibiotics) Hives       Review of Systems   A comprehensive review of 14 systems was done. Pertinent findings noted here and in history of present illness. All the rest negative.  Constitutional: Negative.  Negative for fever, chills, reporting activity change, appetite change and fatigue.   HENT: Negative for congestion and facial swelling.    Eyes: Negative for photophobia, redness and visual disturbance.   Respiratory: Negative for cough and chest tightness.    Cardiovascular: Negative for chest pain, palpitations and has chronic leg swelling.   Gastrointestinal: Negative for nausea, diarrhea, having some constipation,NO  blood in stool and abdominal distention.   Genitourinary: Negative.    Musculoskeletal: Negative.  Ports that he is developed gait instability he fell 4 times in 2 weeks  Has pain in his right hip which is slightly worse today as per him  Skin: Negative.    Neurological: Negative for dizziness, tremors, syncope, weakness, light-headedness and headaches.   Hematological: Does not bruise/bleed easily.   Psychiatric/Behavioral: Negative.        Physical Exam     Recent Vitals 12/13/2019   Weight 181 lbs   BP 98/63   Pulse 60   Temp 97   SpO2 94     Blood pressure 110/68 pulse 80 temp 98 and weights 173LB  Constitutional: Oriented to person, place, and time and appears well-developed.   HEENT:  Normocephalic and atraumatic.  Eyes: Conjunctivae and EOM are normal. Pupils are equal, round, and reactive to light. No discharge.  No scleral icterus. Nose normal. Mouth/Throat: Oropharynx is clear and moist. No oropharyngeal exudate.    NECK: Normal range of motion. Neck supple. No JVD present. No  tracheal deviation present. No thyromegaly present.   CARDIOVASCULAR: Normal rate, regular rhythm and intact distal pulses.  Exam reveals no gallop and no friction rub.  Systolic murmur present.  PULMONARY: Effort normal and breath sounds normal. No respiratory distress.No Wheezing or rales.  ABDOMEN: Soft. Bowel sounds are normal. No distension and no mass.  There is no tenderness. There is no rebound and no guarding. No HSM.  MUSCULOSKELETAL: Normal range of motion.  1+ leg edema and no tenderness. Mild kyphosis, no tenderness.  Right hip with an intact surgical dressing with Mepilex noted-this was removed and incision was examined no drainage noted staples are intact the patient reassured and the dressing was reapplied  LYMPH NODES: Has no cervical, supraclavicular, axillary and groin adenopathy.   NEUROLOGICAL: Alert and oriented to person, place, and time. No cranial nerve deficit.  Normal muscle tone. Coordination normal.   GENITOURINARY: Deferred exam.  SKIN: Skin is warm and dry. No rash noted. No erythema. No pallor.   EXTREMITIES: No cyanosis, no clubbing, has 1+ leg edema. No Deformity.  PSYCHIATRIC: Normal mood, affect and behavior.      Lab Results     Discharge hemoglobin was 10.1.  INR was 1.2.  Platelets 163.  ; K 3.9 ;CR 0.7      NIKHIL Luque

## 2021-06-04 NOTE — PROGRESS NOTES
[facility] Admission note      Patient: Hugh Berman Jr.  MRN: [id number]  Date of Service:  12/23 /19      [facility] [[id number]]  Reason for Visit     Chief Complaint   Patient presents with     Review Of Multiple Medical Conditions       Code Status     FULL CODE    Assessment     -History of a mechanical fall with resultant right femur fracture status post ORIF;  right hip hemiarthroplasty on 12/8/2019  -Fragility fracture with underlying history of osteoporosis suspected  -Pain management  -Underlying history of ischemic cardiomyopathy with severe coronary artery disease recently was in the hospital and underwent stenting with angioplasty in January 2019  -History of rheumatoid arthritis currently has been weaned off all his DMARD agent as per patient  -Chronic lymphedema bilateral lower extremities  -History of prediabetes  - ABLA  -History of recurrent falls with patient reporting 4 falls in 2 weeks  - PERSISTENT HYPOTENSION IN TCU    Plan     Patient has been admitted to the TCU.  WBAT  Continue with his incisional cares and follow-up with orthopedics as scheduled  Continue PT/OT  -As he has had a fragility fracture it has been recommended he goes for outpatient DEXA scan and osteoporosis treatment recommended for him.  Due to low vitamin D levels he has been now on 5000 units daily.  He is also on Liborio-Citrate 600 twice daily.  Pain management optimized adding Vistaril 25 every 6 hours to his regimen with Tylenol.  Due to insomnia concerns melatonin 6 mg has been added.  Due to constipation he is on now on MiraLAX.  Noted to be ambulating using a walker though he has gait instability.  Recheck hemoglobin has improved to 10.7 on recheck labs on 12/18/2019.  Recheck electrolytes and kidney functions were normal.  Continue with his PT OT and rehab refill on oxycodone given-interval increase from every 4 hours to 6 hours.  Patient remains quite hypotensive persistently with  low blood pressures he is not on any significant amount of hypertensives other than a low-dose of metoprolol 25 mg half tab that is 12.5 mg daily.    History     Patient is a very pleasant 74 y.o. male who is admitted to TCU  Patient presents to the hospital after a fall this was felt to be purely mechanical fall as per the patient.  Unfortunately he has significant and profound gait instability as per him and has been falling more he fell 4 times in 2 weeks prior to presenting to the hospital.  Gait remains unstable and he is using a walker and a cane for ambulation but requires assistance.HE remains a falls risk  Imaging revealed he had a right femur fracture for which she was consulted with orthopedics.  They recommended surgical repair.  He has been discharged weightbearing as tolerated  Incision is healing well.  Pain management reviewed with him he has been discharged on Tylenol as needed and oxycodone as needed he is reporting some pain with movement but overall he feels his pain is adequately controlled.  Refill requested on oxycodone.  He does report more pain post therapy and was advised aggressive icing continue with his pain regimen  He does have underlying history of ischemic cardiomyopathy and recently was in the hospital where he had stenting done.  He remains on aspirin and Plavix this will be continued for DVT prophylaxis also  Recheck labs came back and are all stable recheck hemoglobin is 10.3 electrolytes are stable kidney functions are normal  bp continue to run low and he is only on a low dose of metoprolol    Past Medical History     Active Ambulatory (Non-Hospital) Problems    Diagnosis     Fracture of neck of right femur (H)     Essential hypertension     Adjustment insomnia     Closed fracture of proximal end of left humerus with routine healing     Acute ST elevation myocardial infarction (STEMI) of inferior wall (H)     Anticoagulation adequate     Lymphedema     Anemia     Acute systolic  heart failure (H)     Pain     Rheumatoid arthritis involving multiple sites (H)     Past Medical History:   Diagnosis Date     CAD (coronary artery disease)      Carpal tunnel syndrome, bilateral      Closed fracture of distal end of left humerus      Closed fracture of neck of right femur (H)      Dislocation of left shoulder joint      DM2 (diabetes mellitus, type 2) (H)      Elevated C-reactive protein      Hiatal hernia      Internal derangement of left shoulder      Lower extremity edema      Prediabetes      RA (rheumatoid arthritis) (H)      STEMI (ST elevation myocardial infarction) (H)      Thrombophlebitis        Past Social History     Reviewed, and he  reports that he quit smoking about 11 months ago. His smoking use included cigarettes. He has a 3.50 pack-year smoking history. He has never used smokeless tobacco. He reports that he does not drink alcohol.    Family History     Reviewed, and includes a history of CVA in his grandfather    Medication List   Post Discharge Medication Reconciliation Status: discharge medications reconciled and changed, per note/orders (see AVS)   Current Outpatient Medications on File Prior to Visit   Medication Sig Dispense Refill     acetaminophen (TYLENOL) 325 MG tablet Take 650 mg by mouth 4 (four) times a day.        aspirin 81 MG EC tablet Take 81 mg by mouth daily.       atorvastatin (LIPITOR) 40 MG tablet Take 40 mg by mouth at bedtime.       CALCIUM CITRATE ORAL Take 600 mg by mouth 2 (two) times a day.       cholecalciferol, vitamin D3, 5,000 unit Tab Take 1 tablet by mouth daily.       clopidogrel (PLAVIX) 75 mg tablet Take 75 mg by mouth daily.       hydrOXYzine pamoate (VISTARIL) 25 MG capsule Take 25 mg by mouth 4 (four) times a day. Give with tylenol       melatonin 3 mg Tab tablet Take 6 mg by mouth at bedtime.       metoprolol succinate (TOPROL-XL) 25 MG Take 12.5 mg by mouth daily.              nitroglycerin (NITROSTAT) 0.4 MG SL tablet Place 0.4 mg under  the tongue every 5 (five) minutes as needed for chest pain.       oxyCODONE (ROXICODONE) 5 MG immediate release tablet Take 5 mg by mouth every 4 (four) hours as needed for pain.       polyethylene glycol (MIRALAX) 17 gram packet Take 17 g by mouth daily.       No current facility-administered medications on file prior to visit.        Allergies     Allergies   Allergen Reactions     Penicillins Hives     Sulfa (Sulfonamide Antibiotics) Hives       Review of Systems   A comprehensive review of 14 systems was done. Pertinent findings noted here and in history of present illness. All the rest negative.  Constitutional: Negative.  Negative for fever, chills, reporting activity change, appetite change and fatigue.   HENT: Negative for congestion and facial swelling.    Eyes: Negative for photophobia, redness and visual disturbance.   Respiratory: Negative for cough and chest tightness.    Cardiovascular: Negative for chest pain, palpitations and has chronic leg swelling.   Gastrointestinal: Negative for nausea, diarrhea, having some constipation,NO  blood in stool and abdominal distention.   Genitourinary: Negative.    Musculoskeletal: Negative.  Ports that he is developed gait instability he fell 4 times in 2 weeks  Has pain in his right hip which is slightly worse today as per him  Skin: Negative.    Neurological: Negative for dizziness, tremors, syncope, weakness, light-headedness and headaches.   Hematological: Does not bruise/bleed easily.   Psychiatric/Behavioral: Negative.        Physical Exam     Recent Vitals 12/18/2019   Weight 180 lbs   BP 99/62   Pulse 66   Temp 98   SpO2 97       Constitutional: Oriented to person, place, and time and appears well-developed.   HEENT:  Normocephalic and atraumatic.  Eyes: Conjunctivae and EOM are normal. Pupils are equal, round, and reactive to light. No discharge.  No scleral icterus. Nose normal. Mouth/Throat: Oropharynx is clear and moist. No oropharyngeal exudate.     NECK: Normal range of motion. Neck supple. No JVD present. No tracheal deviation present. No thyromegaly present.   CARDIOVASCULAR: Normal rate, regular rhythm and intact distal pulses.  Exam reveals no gallop and no friction rub.  Systolic murmur present.  PULMONARY: Effort normal and breath sounds normal. No respiratory distress.No Wheezing or rales.  ABDOMEN: Soft. Bowel sounds are normal. No distension and no mass.  There is no tenderness. There is no rebound and no guarding. No HSM.  MUSCULOSKELETAL: Normal range of motion.  1+ leg edema and no tenderness. Mild kyphosis, no tenderness.  Right hip with an intact surgical dressing with Mepilex noted-this was removed and incision was examined no drainage noted staples are intact the patient reassured and the dressing was reapplied  LYMPH NODES: Has no cervical, supraclavicular, axillary and groin adenopathy.   NEUROLOGICAL: Alert and oriented to person, place, and time. No cranial nerve deficit.  Normal muscle tone. Coordination normal.   GENITOURINARY: Deferred exam.  SKIN: Skin is warm and dry. No rash noted. No erythema. No pallor.   EXTREMITIES: No cyanosis, no clubbing, has 1+ leg edema. No Deformity.  PSYCHIATRIC: Normal mood, affect and behavior.      Lab Results     Recent Results (from the past 240 hour(s))   Basic Metabolic Panel   Result Value Ref Range    Sodium 139 136 - 145 mmol/L    Potassium 4.4 3.5 - 5.0 mmol/L    Chloride 106 98 - 107 mmol/L    CO2 25 22 - 31 mmol/L    Anion Gap, Calculation 8 5 - 18 mmol/L    Glucose 101 70 - 125 mg/dL    Calcium 8.6 8.5 - 10.5 mg/dL    BUN 21 8 - 28 mg/dL    Creatinine 0.71 0.70 - 1.30 mg/dL    GFR MDRD Af Amer >60 >60 mL/min/1.73m2    GFR MDRD Non Af Amer >60 >60 mL/min/1.73m2   Magnesium   Result Value Ref Range    Magnesium 1.9 1.8 - 2.6 mg/dL   Thyroid Stimulating Hormone (TSH)   Result Value Ref Range    TSH 2.28 0.30 - 5.00 uIU/mL   Vitamin D, Total (25-Hydroxy)   Result Value Ref Range    Vitamin D,  Total (25-Hydroxy) 22.0 (L) 30.0 - 80.0 ng/mL   HM2(CBC w/o Differential)   Result Value Ref Range    WBC 6.0 4.0 - 11.0 thou/uL    RBC 3.30 (L) 4.40 - 6.20 mill/uL    Hemoglobin 10.3 (L) 14.0 - 18.0 g/dL    Hematocrit 32.6 (L) 40.0 - 54.0 %    MCV 99 80 - 100 fL    MCH 31.2 27.0 - 34.0 pg    MCHC 31.6 (L) 32.0 - 36.0 g/dL    RDW 13.5 11.0 - 14.5 %    Platelets 323 140 - 440 thou/uL    MPV 9.7 8.5 - 12.5 fL   Basic Metabolic Panel   Result Value Ref Range    Sodium 137 136 - 145 mmol/L    Potassium 4.1 3.5 - 5.0 mmol/L    Chloride 101 98 - 107 mmol/L    CO2 29 22 - 31 mmol/L    Anion Gap, Calculation 7 5 - 18 mmol/L    Glucose 96 70 - 125 mg/dL    Calcium 9.5 8.5 - 10.5 mg/dL    BUN 16 8 - 28 mg/dL    Creatinine 0.73 0.70 - 1.30 mg/dL    GFR MDRD Af Amer >60 >60 mL/min/1.73m2    GFR MDRD Non Af Amer >60 >60 mL/min/1.73m2   Magnesium   Result Value Ref Range    Magnesium 2.0 1.8 - 2.6 mg/dL   HM2(CBC w/o Differential)   Result Value Ref Range    WBC 6.2 4.0 - 11.0 thou/uL    RBC 3.45 (L) 4.40 - 6.20 mill/uL    Hemoglobin 10.7 (L) 14.0 - 18.0 g/dL    Hematocrit 34.0 (L) 40.0 - 54.0 %    MCV 99 80 - 100 fL    MCH 31.0 27.0 - 34.0 pg    MCHC 31.5 (L) 32.0 - 36.0 g/dL    RDW 13.6 11.0 - 14.5 %    Platelets 345 140 - 440 thou/uL    MPV 9.7 8.5 - 12.5 fL         NIKHIL Luque

## 2021-06-04 NOTE — PROGRESS NOTES
[facility] Admission note      Patient: Hugh Berman Jr.  MRN: [id number]  Date of Service:  12/11/19      [facility] [[id number]]  Reason for Visit     Chief Complaint   Patient presents with     H & P       Code Status     FULL CODE    Assessment     -History of a mechanical fall with resultant right femur fracture status post ORIF;  right hip hemiarthroplasty on 12/8/2019  -Fragility fracture with underlying history of osteoporosis suspected  -Pain management  -Underlying history of ischemic cardiomyopathy with severe coronary artery disease recently was in the hospital and underwent stenting with angioplasty in January 2019  -History of rheumatoid arthritis currently has been weaned off all his DMARD agent as per patient  -Chronic lymphedema bilateral lower extremities  -History of prediabetes  - ABLA  -History of recurrent falls with patient reporting 4 falls in 2 weeks    Plan     Patient has been admitted to the TCU.  WBAT  Continue with his incisional cares and follow-up with orthopedics as scheduled  Continue PT/OT  -As he has had a fragility fracture it has been recommended he goes for outpatient DEXA scan and osteoporosis treatment once he is discharged in the TCU this was reviewed with him  Empirically start him on vitamin D 2000 units daily  Pain management reviewed and we will schedule him on Tylenol 650 mg 4 times daily  Minimize narcotics advised aggressive icing at least 3 times a day and as needed as needed  Continue on his aspirin as well as Plavix with underlying history of coronary artery disease he denies any recent chest pain  Tubigrip stockings are offered for lymphedema patient does not want them as yet  Monitor blood pressures he is has a prior history of hypertension he is on metoprolol  Blood pressures low at 110/68.  Recheck routine labs.    History     Patient is a very pleasant 74 y.o. male who is admitted to TCU  Patient presents to the hospital  after a fall this was felt to be purely mechanical fall as per the patient.  Unfortunately he has significant and profound gait instability as per him and has been falling more he fell 4 times in 2 weeks prior to presenting to the hospital.  Imaging revealed he had a right femur fracture for which she was consulted with orthopedics.  They recommended surgical repair.  He has been discharged weightbearing as tolerated  Pain management reviewed with him he has been discharged on Tylenol as needed and oxycodone as needed he is reporting some pain with movement but overall he feels his pain is adequately controlled.  He does have underlying history of ischemic cardiomyopathy and recently was in the hospital where he had stenting done.  He remains on aspirin and Plavix this will be continued for DVT prophylaxis also      Past Medical History     Active Ambulatory (Non-Hospital) Problems    Diagnosis     Closed fracture of proximal end of left humerus with routine healing     Acute ST elevation myocardial infarction (STEMI) of inferior wall (H)     Anticoagulation adequate     Lymphedema     Anemia     Acute systolic heart failure (H)     Pain     Rheumatoid arthritis involving multiple sites (H)     Past Medical History:   Diagnosis Date     CAD (coronary artery disease)      Carpal tunnel syndrome, bilateral      Closed fracture of distal end of left humerus      Closed fracture of neck of right femur (H)      Dislocation of left shoulder joint      DM2 (diabetes mellitus, type 2) (H)      Elevated C-reactive protein      Hiatal hernia      Internal derangement of left shoulder      Lower extremity edema      Prediabetes      RA (rheumatoid arthritis) (H)      STEMI (ST elevation myocardial infarction) (H)      Thrombophlebitis        Past Social History     Reviewed, and he  reports that he quit smoking about 10 months ago. His smoking use included cigarettes. He has a 3.50 pack-year smoking history. He has never used  smokeless tobacco. He reports that he does not drink alcohol.    Family History     Reviewed, and includes a history of CVA in his grandfather    Medication List   Post Discharge Medication Reconciliation Status: discharge medications reconciled and changed, per note/orders (see AVS)   Current Outpatient Medications on File Prior to Visit   Medication Sig Dispense Refill     acetaminophen (TYLENOL) 325 MG tablet Take 650 mg by mouth 4 (four) times a day.        aspirin 81 MG EC tablet Take 81 mg by mouth daily.       atorvastatin (LIPITOR) 40 MG tablet Take 40 mg by mouth at bedtime.       clopidogrel (PLAVIX) 75 mg tablet Take 75 mg by mouth daily.       metoprolol succinate (TOPROL-XL) 25 MG Take 12.5 mg by mouth daily.              nitroglycerin (NITROSTAT) 0.4 MG SL tablet Place 0.4 mg under the tongue every 5 (five) minutes as needed for chest pain.       [DISCONTINUED] folic acid (FOLVITE) 1 MG tablet Take 1 mg by mouth daily.       [DISCONTINUED] gabapentin (NEURONTIN) 100 MG capsule Take 200 mg by mouth 3 (three) times a day.       [DISCONTINUED] lidocaine (LMX) 4 % cream Apply topically 3 (three) times a day. Apply to R shoulder/arm       No current facility-administered medications on file prior to visit.        Allergies     Allergies   Allergen Reactions     Penicillins Hives     Sulfa (Sulfonamide Antibiotics) Hives       Review of Systems   A comprehensive review of 14 systems was done. Pertinent findings noted here and in history of present illness. All the rest negative.  Constitutional: Negative.  Negative for fever, chills, reporting activity change, appetite change and fatigue.   HENT: Negative for congestion and facial swelling.    Eyes: Negative for photophobia, redness and visual disturbance.   Respiratory: Negative for cough and chest tightness.    Cardiovascular: Negative for chest pain, palpitations and has chronic leg swelling.   Gastrointestinal: Negative for nausea, diarrhea, having some  constipation,NO  blood in stool and abdominal distention.   Genitourinary: Negative.    Musculoskeletal: Negative.  Ports that he is developed gait instability he fell 4 times in 2 weeks  Has pain in his right hip  Skin: Negative.    Neurological: Negative for dizziness, tremors, syncope, weakness, light-headedness and headaches.   Hematological: Does not bruise/bleed easily.   Psychiatric/Behavioral: Negative.        Physical Exam     Recent Vitals 2/5/2019   Weight 200 lbs   /73   Pulse 66   Temp 98   SpO2 97     Blood pressure 110/68 pulse 80 temp 98 and weights 173LB  Constitutional: Oriented to person, place, and time and appears well-developed.   HEENT:  Normocephalic and atraumatic.  Eyes: Conjunctivae and EOM are normal. Pupils are equal, round, and reactive to light. No discharge.  No scleral icterus. Nose normal. Mouth/Throat: Oropharynx is clear and moist. No oropharyngeal exudate.    NECK: Normal range of motion. Neck supple. No JVD present. No tracheal deviation present. No thyromegaly present.   CARDIOVASCULAR: Normal rate, regular rhythm and intact distal pulses.  Exam reveals no gallop and no friction rub.  Systolic murmur present.  PULMONARY: Effort normal and breath sounds normal. No respiratory distress.No Wheezing or rales.  ABDOMEN: Soft. Bowel sounds are normal. No distension and no mass.  There is no tenderness. There is no rebound and no guarding. No HSM.  MUSCULOSKELETAL: Normal range of motion.  1+ leg edema and no tenderness. Mild kyphosis, no tenderness.  Right hip with an intact surgical dressing with Mepilex noted  LYMPH NODES: Has no cervical, supraclavicular, axillary and groin adenopathy.   NEUROLOGICAL: Alert and oriented to person, place, and time. No cranial nerve deficit.  Normal muscle tone. Coordination normal.   GENITOURINARY: Deferred exam.  SKIN: Skin is warm and dry. No rash noted. No erythema. No pallor.   EXTREMITIES: No cyanosis, no clubbing, has 1+ leg edema. No  Deformity.  PSYCHIATRIC: Normal mood, affect and behavior.      Lab Results     Discharge hemoglobin was 10.1.  INR was 1.2.  Platelets 163.  ; K 3.9 ;CR 0.7      NIKHIL Luque

## 2021-06-16 PROBLEM — I50.21 ACUTE SYSTOLIC HEART FAILURE (H): Status: ACTIVE | Noted: 2019-01-28

## 2021-06-16 PROBLEM — F51.02 ADJUSTMENT INSOMNIA: Status: ACTIVE | Noted: 2019-12-14

## 2021-06-16 PROBLEM — I21.19 ACUTE ST ELEVATION MYOCARDIAL INFARCTION (STEMI) OF INFERIOR WALL (H): Status: ACTIVE | Noted: 2019-01-28

## 2021-06-16 PROBLEM — R52 PAIN: Status: ACTIVE | Noted: 2019-01-28

## 2021-06-16 PROBLEM — M06.9 RHEUMATOID ARTHRITIS INVOLVING MULTIPLE SITES (H): Status: ACTIVE | Noted: 2019-01-28

## 2021-06-16 PROBLEM — Z79.01 ANTICOAGULATION ADEQUATE: Status: ACTIVE | Noted: 2019-01-28

## 2021-06-16 PROBLEM — I10 ESSENTIAL HYPERTENSION: Status: ACTIVE | Noted: 2019-12-14

## 2021-06-16 PROBLEM — D64.9 ANEMIA: Status: ACTIVE | Noted: 2019-01-28

## 2021-06-16 PROBLEM — S72.001A FRACTURE OF NECK OF RIGHT FEMUR (H): Status: ACTIVE | Noted: 2019-12-14

## 2021-06-16 PROBLEM — I89.0 LYMPHEDEMA: Status: ACTIVE | Noted: 2019-01-28

## 2021-06-16 PROBLEM — S42.202D CLOSED FRACTURE OF PROXIMAL END OF LEFT HUMERUS WITH ROUTINE HEALING: Status: ACTIVE | Noted: 2019-01-28

## 2021-06-18 NOTE — LETTER
Letter by Erik Le NP at      Author: Erik Le NP Service: -- Author Type: --    Filed:  Encounter Date: 1/28/2019 Status: (Other)         Patient: Hugh Berman Jr.   MR Number: [id number]   YOB: 1945   Date of Visit: 1/28/2019     [facility]      Facility:    [facility] [[id number]]  Code Status: POLST AVAILABLE      Chief Complaint/Reason for Visit:       Chief Complaint   Patient presents with   ? Review Of Multiple Medical Conditions       HPI:   Hugh is a 73 y.o. male who is a recent transfer from [facility] with admission on 1/16/19 and discharged on 1/21/19.  He has a past medical history of closed fracture of leftproximal humerus, prediabetes, rheumatoid arthritis who underwent left reverse total shoulder arthroplasty on 1/16/19 by Dr. Gabriella Younger.  He developed chest pain postoperatively, EKG revealed inferior STEMI, underwent PTCA found 100% stenosis of proximal circumflex, MERCEDEZ placed also 50% stenosis of mild LAD.  Cardiology recommended stress test in 4-6 weeks.  Repeat echo showed inferior wall posterior wall mid lateral segment and basal late lateral segment akinesis.  Aspirin and Plavix started.  He then developed hypotension and LGT that was remedied with fluid boluses and started on vancomycin and cefepime per sepsis protocol.  He also received 1U PRBCs, dripped down to 7.2 and then given another unit. He stabilized prior to discharge to the TCU.      Today he has limited concerns, though still has R shoulder weakness that causes R arm pain that is being treated with lidocaine gel. This is being supplied his family. Patient denies headache, chest pain, numbness or tingling, shortest of breath, eating or swallowing concerns, nausea or vomiting, diarrhea or bowel abnormalities, or no new integumentary concerns today.  Therapy is working with increasing strength and stability.     Past Medical  History:  Past Medical History:   Diagnosis Date   ? CAD (coronary artery disease)    ? Carpal tunnel syndrome, bilateral    ? Closed fracture of distal end of left humerus    ? DM2 (diabetes mellitus, type 2) (H)    ? Elevated C-reactive protein    ? Hiatal hernia    ? RA (rheumatoid arthritis) (H)    ? STEMI (ST elevation myocardial infarction) (H)    ? Thrombophlebitis            Surgical History:  Past Surgical History:   Procedure Laterality Date   ? CORONARY STENT PLACEMENT  2019   ? REVERSE TOTAL SHOULDER ARTHROPLASTY Left 2019   ? WISDOM TOOTH EXTRACTION  1966       Family History:   No family history on file.    Social History:    Social History     Socioeconomic History   ? Marital status: Single     Spouse name: Not on file   ? Number of children: Not on file   ? Years of education: Not on file   ? Highest education level: Not on file   Social Needs   ? Financial resource strain: Not on file   ? Food insecurity - worry: Not on file   ? Food insecurity - inability: Not on file   ? Transportation needs - medical: Not on file   ? Transportation needs - non-medical: Not on file   Occupational History   ? Not on file   Tobacco Use   ? Smoking status: Light Tobacco Smoker     Last attempt to quit: 1982     Years since quittin.5   ? Smokeless tobacco: Never Used   ? Tobacco comment: 3 cig/day; restarted  after wife    Substance and Sexual Activity   ? Alcohol use: No   ? Drug use: Not on file   ? Sexual activity: Not on file   Other Topics Concern   ? Not on file   Social History Narrative   ? Not on file          Review of Systems   Constitutional: Positive for activity change and fatigue. Negative for appetite change, chills, diaphoresis and fever.   HENT: Positive for hearing loss.    Eyes: Negative for photophobia, redness and visual disturbance.   Respiratory: Positive for cough. Negative for shortness of breath and wheezing.    Cardiovascular: Negative for leg swelling.    Gastrointestinal: Negative for abdominal distention, abdominal pain, blood in stool, constipation, diarrhea and nausea.   Endocrine: Negative.    Genitourinary: Negative for dysuria.   Musculoskeletal: Positive for myalgias.        R arm/shoulder   Skin:        intact   Allergic/Immunologic: Negative.    Neurological: Positive for weakness. Negative for dizziness, numbness and headaches.        R shoulder   Hematological: Negative.    Psychiatric/Behavioral: Negative for agitation, confusion, hallucinations and sleep disturbance. The patient is not nervous/anxious.        Vitals:    01/28/19 1220   BP: 116/74   Pulse: 66   Resp: 18   Temp: 98.8  F (37.1  C)   SpO2: 98%   Weight: 208 lb (94.3 kg)       Physical Exam   Constitutional: He is oriented to person, place, and time.   No acute issues   HENT:   Head: Normocephalic and atraumatic.   Mouth/Throat: Oropharynx is clear and moist. No oropharyngeal exudate.   Eyes: Right eye exhibits no discharge. Left eye exhibits no discharge. No scleral icterus.   Neck: Normal range of motion.   Cardiovascular: Normal rate. Exam reveals no gallop and no friction rub.   No murmur heard.  Pulmonary/Chest: Effort normal and breath sounds normal. No stridor. No respiratory distress. He has no wheezes. He has no rales.   Dim, RA   Abdominal: Soft. Bowel sounds are normal. He exhibits no distension. There is no tenderness. There is no rebound.   Denies constipation or diarrhea   Genitourinary:   Genitourinary Comments: deferred   Musculoskeletal: He exhibits edema. He exhibits no tenderness or deformity.   R shoulder weakness with R arm pain, 1-2+ BLE edema, mostly pedal   Lymphadenopathy:     He has no cervical adenopathy.   Neurological: He is oriented to person, place, and time. No cranial nerve deficit.   No deficit noted   Vitals reviewed.      Medication List:  Current Outpatient Medications   Medication Sig   ? aspirin 81 MG EC tablet Take 81 mg by mouth daily.   ?  atorvastatin (LIPITOR) 40 MG tablet Take 40 mg by mouth at bedtime.   ? clopidogrel (PLAVIX) 75 mg tablet Take 75 mg by mouth daily.   ? folic acid (FOLVITE) 1 MG tablet Take 1 mg by mouth daily.   ? gabapentin (NEURONTIN) 100 MG capsule Take 200 mg by mouth 3 (three) times a day.   ? HYDROcodone-acetaminophen 5-325 mg per tablet Take 1-2 tablets by mouth every 4 (four) hours as needed for pain.   ? lisinopril (PRINIVIL,ZESTRIL) 2.5 MG tablet Take 2.5 mg by mouth daily.   ? metoprolol succinate (TOPROL-XL) 25 MG Take 25 mg by mouth daily.   ? nitroglycerin (NITROSTAT) 0.4 MG SL tablet Place 0.4 mg under the tongue every 5 (five) minutes as needed for chest pain.       Labs:  Results for orders placed or performed in visit on 01/25/19   Basic Metabolic Panel   Result Value Ref Range    Sodium 137 136 - 145 mmol/L    Potassium 4.0 3.5 - 5.0 mmol/L    Chloride 102 98 - 107 mmol/L    CO2 24 22 - 31 mmol/L    Anion Gap, Calculation 11 5 - 18 mmol/L    Glucose 99 70 - 125 mg/dL    Calcium 8.9 8.5 - 10.5 mg/dL    BUN 16 8 - 28 mg/dL    Creatinine 0.69 (L) 0.70 - 1.30 mg/dL    GFR MDRD Af Amer >60 >60 mL/min/1.73m2    GFR MDRD Non Af Amer >60 >60 mL/min/1.73m2     Lab Results   Component Value Date    WBC 6.7 01/25/2019    HGB 10.7 (L) 01/25/2019    HCT 33.5 (L) 01/25/2019     01/25/2019     01/25/2019     No results found for: TSH    No results found for: [id number]    No results found for: [id number]      Assessment/Plan:    Close fracture of left left reversal to his shoulder arthroplasty on 1/16/19 as an elective procedure: Follow-up with Ortho was recommended     Postoperative inferior STEMI: Underwent PTCA, stent placed in proximal circumflex, also found 50% stenosis in the mid LAD, neurology recommended outpatient function testing in 4-6 weeks, continue atorvastatin 40 mg at bedtime    Acute systolic congestive heart failure with ischemic cardiomyopathy: Echocardiogram showed EF 47%    Pain control:  continue lidocaine 4%, apply to R shoulder/arm three times a day, family supplying, dc narco, started on tylenol 650mg three times a day    Thrombophlebitis: given doxycyline x5d, resolved    Anticoagulation: Started on Plavix and aspirin    ALBA: Given 2 units of packed red blood cells, last hemoglobin 10.7    Lymphedema: Has 1+ bilateral lower extremity edema, monitor weights, may need Lasix    Rheumatoid arthritis: Methotrexate has been discontinued as long with his immunosuppressants, recommendation of orthopedics for least 4-6 weeks    Fever of unclear etiology: Resolved    Prediabetes: Last A1c 5.5    Neuropathy and chronic back pain: Continue gabapentin 200 mg 3 times daily    Hypertension: continue lisinopril 2.5mg and metoprolol 25mg daily, BP controlled    Disposition: Plans to return to daughter's home    The care plan has been reviewed and all orders signed. Changes to care plan, if any, as noted. Otherwise, continue care plan of care.  The total time spent with this patient was 45 minutes, with greater than 50% spent in counseling and coordination of care that included multiple issues per chart review.      Electronically signed by: Erik Le NP

## 2021-06-18 NOTE — LETTER
Letter by Erik Le NP at      Author: Erik Le NP Service: -- Author Type: --    Filed:  Encounter Date: 2/5/2019 Status: (Other)         Patient: Hugh Berman Jr.   MR Number: [id number]   YOB: 1945   Date of Visit: 2/5/2019     [facility]      Facility:    [facility] [[id number]]  Code Status: POLST AVAILABLE      Chief Complaint/Reason for Visit:       Chief Complaint   Patient presents with   ? Review Of Multiple Medical Conditions       HPI:   Hugh is a 73 y.o. male who is a recent transfer from [facility] with admission on 1/16/19 and discharged on 1/21/19.  He has a past medical history of closed fracture of left proximal humerus, prediabetes, rheumatoid arthritis who underwent left reverse total shoulder arthroplasty on 1/16/19 by Dr. Gabriella Younger.  He developed chest pain postoperatively, EKG revealed inferior STEMI, underwent PTCA found 100% stenosis of proximal circumflex, MERCEDEZ placed also 50% stenosis of mild LAD.  Cardiology recommended stress test in 4-6 weeks.  Repeat echo showed inferior wall posterior wall mid lateral segment and basal late lateral segment akinesis.  Aspirin and Plavix started.  He then developed hypotension and LGT that was remedied with fluid boluses and started on vancomycin and cefepime per sepsis protocol.  He also received 1U PRBCs, dripped down to 7.2 and then given another unit. He stabilized prior to discharge to the TCU.      Today he has limited concerns, previously found to have spontaneous L shoulder dislocation, has f/u today and he is wearing his brace. Patient denies pain, headache, chest pain, numbness or tingling, shortest of breath, eating or swallowing concerns, nausea or vomiting, diarrhea or bowel abnormalities, or no new integumentary concerns today.     Past Medical History:  Past Medical History:   Diagnosis Date   ? CAD (coronary artery disease)    ?  Carpal tunnel syndrome, bilateral    ? Closed fracture of distal end of left humerus    ? DM2 (diabetes mellitus, type 2) (H)    ? Elevated C-reactive protein    ? Hiatal hernia    ? RA (rheumatoid arthritis) (H)    ? STEMI (ST elevation myocardial infarction) (H)    ? Thrombophlebitis            Surgical History:  Past Surgical History:   Procedure Laterality Date   ? CORONARY STENT PLACEMENT  2019   ? REVERSE TOTAL SHOULDER ARTHROPLASTY Left 2019   ? WISDOM TOOTH EXTRACTION  1966       Family History:   No family history on file.    Social History:    Social History     Socioeconomic History   ? Marital status: Single     Spouse name: Not on file   ? Number of children: Not on file   ? Years of education: Not on file   ? Highest education level: Not on file   Social Needs   ? Financial resource strain: Not on file   ? Food insecurity - worry: Not on file   ? Food insecurity - inability: Not on file   ? Transportation needs - medical: Not on file   ? Transportation needs - non-medical: Not on file   Occupational History   ? Not on file   Tobacco Use   ? Smoking status: Light Tobacco Smoker     Last attempt to quit: 1982     Years since quittin.6   ? Smokeless tobacco: Never Used   ? Tobacco comment: 3 cig/day; restarted  after wife    Substance and Sexual Activity   ? Alcohol use: No   ? Drug use: Not on file   ? Sexual activity: Not on file   Other Topics Concern   ? Not on file   Social History Narrative   ? Not on file          Review of Systems   Constitutional: Positive for activity change. Negative for appetite change, chills, diaphoresis, fatigue and fever.        Previously found to have L shoulder dislocation   HENT: Negative for hearing loss.    Eyes: Negative for photophobia, redness and visual disturbance.   Respiratory: Negative for cough, shortness of breath and wheezing.    Cardiovascular: Negative for leg swelling.   Gastrointestinal: Negative for abdominal  distention, abdominal pain, blood in stool, constipation, diarrhea and nausea.   Endocrine: Negative.    Genitourinary: Negative for dysuria.   Musculoskeletal: Negative for myalgias.        Denies pain   Skin:        intact   Allergic/Immunologic: Negative.    Neurological: Positive for weakness. Negative for dizziness, numbness and headaches.        L shoulder in brace   Hematological: Negative.    Psychiatric/Behavioral: Negative for agitation, confusion, hallucinations and sleep disturbance. The patient is not nervous/anxious.        Vitals:    02/05/19 1122   BP: 124/73   Pulse: 66   Resp: 18   Temp: 98  F (36.7  C)   SpO2: 97%   Weight: 200 lb (90.7 kg)       Physical Exam   Constitutional: He is oriented to person, place, and time.   No acute issues   HENT:   Head: Normocephalic and atraumatic.   Mouth/Throat: Oropharynx is clear and moist. No oropharyngeal exudate.   Eyes: Right eye exhibits no discharge. Left eye exhibits no discharge. No scleral icterus.   Neck: Normal range of motion.   Cardiovascular: Normal rate. Exam reveals no gallop and no friction rub.   No murmur heard.  Pulmonary/Chest: Effort normal and breath sounds normal. No stridor. No respiratory distress. He has no wheezes. He has no rales.   Dim, RA   Abdominal: Soft. Bowel sounds are normal. He exhibits no distension. There is no tenderness. There is no rebound.   Denies constipation or diarrhea   Genitourinary:   Genitourinary Comments: deferred   Musculoskeletal: He exhibits no edema, tenderness or deformity.   L shoulder in brace   Lymphadenopathy:     He has no cervical adenopathy.   Neurological: He is oriented to person, place, and time. No cranial nerve deficit.   No deficit noted   Vitals reviewed.      Medication List:  Current Outpatient Medications   Medication Sig   ? acetaminophen (TYLENOL) 325 MG tablet Take 650 mg by mouth every 4 (four) hours as needed for pain.   ? aspirin 81 MG EC tablet Take 81 mg by mouth daily.   ?  atorvastatin (LIPITOR) 40 MG tablet Take 40 mg by mouth at bedtime.   ? clopidogrel (PLAVIX) 75 mg tablet Take 75 mg by mouth daily.   ? folic acid (FOLVITE) 1 MG tablet Take 1 mg by mouth daily.   ? gabapentin (NEURONTIN) 100 MG capsule Take 200 mg by mouth 3 (three) times a day.   ? lidocaine (LMX) 4 % cream Apply topically 3 (three) times a day. Apply to R shoulder/arm   ? metoprolol succinate (TOPROL-XL) 25 MG Take 12.5 mg by mouth daily.          ? nitroglycerin (NITROSTAT) 0.4 MG SL tablet Place 0.4 mg under the tongue every 5 (five) minutes as needed for chest pain.       Labs:  Results for orders placed or performed in visit on 01/25/19   Basic Metabolic Panel   Result Value Ref Range    Sodium 137 136 - 145 mmol/L    Potassium 4.0 3.5 - 5.0 mmol/L    Chloride 102 98 - 107 mmol/L    CO2 24 22 - 31 mmol/L    Anion Gap, Calculation 11 5 - 18 mmol/L    Glucose 99 70 - 125 mg/dL    Calcium 8.9 8.5 - 10.5 mg/dL    BUN 16 8 - 28 mg/dL    Creatinine 0.69 (L) 0.70 - 1.30 mg/dL    GFR MDRD Af Amer >60 >60 mL/min/1.73m2    GFR MDRD Non Af Amer >60 >60 mL/min/1.73m2     Lab Results   Component Value Date    WBC 6.7 01/25/2019    HGB 10.7 (L) 01/25/2019    HCT 33.5 (L) 01/25/2019     01/25/2019     01/25/2019     No results found for: TSH    No results found for: [id number]    No results found for: [id number]      Assessment/Plan:    Close fracture of left left reversal to his shoulder arthroplasty on 1/16/19 as an elective procedure and later found to have spontaneous L shoulder dislocation: wearing brace, follow-up with Ortho today then discharging soon    Postoperative inferior STEMI: Underwent PTCA, stent placed in proximal circumflex, also found 50% stenosis in the mid LAD, neurology recommended outpatient function testing in 4-6 weeks, continue atorvastatin 40 mg at bedtime    Acute systolic congestive heart failure with ischemic cardiomyopathy: Echocardiogram showed EF 47%    Pain control: althea'angeli  narco, started on tylenol 650mg q4h PRN, minimal doses given    Thrombophlebitis: given doxycyline x5d, resolved    Anticoagulation: Started on Plavix and aspirin    ALBA: Given 2 units of packed red blood cells, last hemoglobin 10.7    Lymphedema: Has 1+ bilateral lower extremity edema, monitor weights    Rheumatoid arthritis: Methotrexate has been discontinued as long with his immunosuppressants, recommendation of orthopedics for least 4-6 weeks    Fever of unclear etiology: Resolved    Prediabetes: Last A1c 5.5    Neuropathy and chronic back pain: Continue gabapentin 200 mg 3 times daily    Hypertension: per hypotension (SBP <110 in afternoon) will dc lisinopril and decrease metoprolol to 12.5mg daily    Disposition: Plans to return to daughter's home    The care plan has been reviewed and all orders signed. Changes to care plan, if any, as noted. Otherwise, continue care plan of care.  The total time spent with this patient was 25 minutes, with greater than 50% spent in counseling and coordination of care that included multiple issues per hypotension.      Electronically signed by: Erik Le NP

## 2021-06-18 NOTE — LETTER
Letter by Ayala Amezcua MBBS at      Author: Ayala Amezcua MBBS Service: -- Author Type: --    Filed:  Encounter Date: 2/6/2019 Status: (Other)         Patient: Hugh Berman Jr.   MR Number: [id number]   YOB: 1945   Date of Visit: 2/6/2019     [facility]    Facility:   [facility] [[id number]]   Code Status: DNR/DNI  PCP: Provider, No Primary Care   Phone: None   Fax: None      CHIEF COMPLAINT/REASON FOR VISIT:  Chief Complaint   Patient presents with   ? Review Of Multiple Medical Conditions       HISTORY COURSE:  Patient was admitted with a closed fracture of the left proximal humerus status post left reverse total shoulder arthroplasty on 1/16/2019.  He continues to be nonweightbearing on his left upper extremity with a limited immobilizer.  Pain concerns are minimal he is on Tylenol alone.  Incision was examined in its intact with no drainage.  Patient has a significant history of coronary artery disease with ischemic cardiomyopathy and lower extremity lymphedema weights have improved to 200 pounds.  Unfortunately is limited by significant hypotension and has not been able to tolerate much of diuretics due to that.  He continues to have lymphedema in his legs though.  He is asymptomatic and not short of breath.  He is on a lower dose of metoprolol and on a very low-dose of lisinopril upon discharge at 2.5 mg.  He understands he needs an outpatient cardiology follow-up for management of hypertension coronary artery disease as well as congestive heart failure and needs outpatient functional testing.  Norco was discontinued for pain management due to no pain    Review of Systems    Constitutional: Negative.  Negative for fever, chills, HAS activity change, appetite change and fatigue.   HENT: Negative for congestion and facial swelling.    Eyes: Negative for photophobia, redness and visual disturbance.   Respiratory: Negative for cough and chest  tightness.    Cardiovascular: Negative for chest pain, palpitations and has leg swelling.   Gastrointestinal: Negative for nausea, diarrhea, constipation, blood in stool and abdominal distention.   Genitourinary: Negative.    Musculoskeletal: Negative.  Reporting minimal pain in back as well as the shoulder  Skin: Negative.    Neurological: Negative for dizziness, tremors, syncope, weakness, light-headedness and headaches.   Hematological: Does not bruise/bleed easily.   Psychiatric/Behavioral: Negative.      Physical Exam   Weight is 200 pounds, blood pressure 117/72 temp 98 pulse 82  Constitutional: Oriented to person, place, and time and appears well-developed.   HEENT:  Normocephalic and atraumatic.  Eyes: Conjunctivae and EOM are normal. Pupils are equal, round, and reactive to light. No discharge.  No scleral icterus. Nose normal. Mouth/Throat: Oropharynx is clear and moist. No oropharyngeal exudate.    NECK: Normal range of motion. Neck supple. No JVD present. No tracheal deviation present. No thyromegaly present.   CARDIOVASCULAR: Normal rate, regular rhythm and intact distal pulses.  Exam reveals no gallop and no friction rub.  Systolic murmur present.  PULMONARY: Effort normal and breath sounds normal. No respiratory distress.No Wheezing or rales.  ABDOMEN: Soft. Bowel sounds are normal. No distension and no mass.  There is no tenderness. There is no rebound and no guarding. No HSM.  MUSCULOSKELETAL: Normal range of motion. No edema and no tenderness. Mild kyphosis, no tenderness.  Left shoulder surgical incision is intact arm is in a sling  LYMPH NODES: Has no cervical, supraclavicular, axillary and groin adenopathy.   NEUROLOGICAL: Alert and oriented to person, place, and time. No cranial nerve deficit.  Normal muscle tone. Coordination normal.   GENITOURINARY: Deferred exam.  SKIN: Skin is warm and dry. No rash noted. No erythema. No pallor.   EXTREMITIES: No cyanosis, no clubbing, no edema. No  Deformity.  PSYCHIATRIC: Normal mood, affect and behavior.    MEDICATION LIST:  Updated Medication list, printed and signed at discharge, please refer to that final medication list from the Skilled Nursing Facility for accuracy.    Lisinopril 2.5 at bedtime added.  This was discontinued due to low blood pressures in the TCU.  Methotrexate to be resumed only after doing okay by orthopedics  Current Outpatient Medications   Medication Sig   ? acetaminophen (TYLENOL) 325 MG tablet Take 650 mg by mouth every 4 (four) hours as needed for pain.   ? aspirin 81 MG EC tablet Take 81 mg by mouth daily.   ? atorvastatin (LIPITOR) 40 MG tablet Take 40 mg by mouth at bedtime.   ? clopidogrel (PLAVIX) 75 mg tablet Take 75 mg by mouth daily.   ? folic acid (FOLVITE) 1 MG tablet Take 1 mg by mouth daily.   ? gabapentin (NEURONTIN) 100 MG capsule Take 200 mg by mouth 3 (three) times a day.   ? lidocaine (LMX) 4 % cream Apply topically 3 (three) times a day. Apply to R shoulder/arm   ? metoprolol succinate (TOPROL-XL) 25 MG Take 12.5 mg by mouth daily.          ? nitroglycerin (NITROSTAT) 0.4 MG SL tablet Place 0.4 mg under the tongue every 5 (five) minutes as needed for chest pain.       DISCHARGE DIAGNOSIS:    ICD-10-CM    1. Acute systolic heart failure (H) I50.21    2. Closed fracture of proximal end of left humerus with routine healing, unspecified fracture morphology, subsequent encounter S42.202D    3. Rheumatoid arthritis involving multiple sites, unspecified rheumatoid factor presence (H) M06.9    4. Iron deficiency anemia, unspecified iron deficiency anemia type D50.9    5. Lymphedema I89.0        MEDICAL EQUIPMENT NEEDS:  None    DISCHARGE PLAN/FACE TO FACE:  I certify that services are/were furnished while this patient was under the care of a physician and that a physician or an allowed non-physician practitioner (NPP), had a face-to-face encounter that meets the physician face-to-face encounter requirements. The  encounter was in whole, or in part, related to the primary reason for home health. The patient is confined to his/her home and needs intermittent skilled nursing, physical therapy, speech-language pathology, or the continued need for occupational therapy. A plan of care has been established by a physician and is periodically reviewed by a physician.  Date of Face-to-Face Encounter: 2/6/19    I certify that, based on my findings, the following services are medically necessary home health services: Outpatient cardiac rehab    My clinical findings support the need for the above skilled services because: (Please write a brief narrative summary that describes what the RN, PT, SLP, or other services will be doing in the home. A list of diagnoses in this section does not meet the CMS requirements.)  Recent history of CAD; ischemic cardia myopathy    The patient is, or has been, under my care and I have initiated the establishment of the plan of care. This patient will be followed by a physician who will periodically review the plan of care.    Schedule follow up visit with primary care provider within 7 days to reestablish care.  Follow-up with cardiology discussed low blood pressures as well as lymphedema concerns.  He will need outpatient follow-up with cardiology for follow-up of his congestive heart failure as well as CAD.  Follow-up with orthopedics to discuss weightbearing status on his left shoulder  Electronically signed by: NIKHIL Luque

## 2021-06-18 NOTE — LETTER
Letter by Ayala Amezcua MBBS at      Author: Ayala Amezcua MBBS Service: -- Author Type: --    Filed:  Encounter Date: 1/23/2019 Status: (Other)         Patient: Hugh Berman Jr.   MR Number: [id number]   YOB: 1945   Date of Visit: 1/23/2019       [facility] Admission note      Patient: Hugh Berman Jr.  MRN: [id number]  Date of Service: 1/23/2019      [facility] [[id number]]  Reason for Visit     Chief Complaint   Patient presents with   ? H & P       Code Status   DNR/DNI requesting comfort focused treatment      Assessment   Close fracture of the left proximal humerus status post left reverse total shoulder arthroplasty on 1/16/19 and an elective procedure  Postoperative inferior ST SIOBHAN  Status post angioplasty with stents done secondary to 100% stenoses of the proximal circumflex artery.  50% stenosis of mid LAD for which cardiology has recommended outpatient function testing  Acute systolic congestive heart failure with ischemic cardiomyopathy  Repeat echo prior to discharge showing inferior wall, posterior wall, mid lateral segment and basal lateral segment akinesis  Currently discharged on dual antiplatelet treatment with aspirin and Plavix  Lymphedema bilateral lower extremity which is a new finding for him.  Hypotension postoperative  Anemia secondary to blood loss and dilutional effect with a drop in hemoglobin to 7.2 and resolved with 1 unit of blood transfusion  Fever of unclear etiology  Thrombophlebitis  RT HAND currently discharged on oral antibiotics  History of chronic back pain  Debilitation  History of prediabetes  Plan   Patient has been discharged to the TCU for strengthening.  Plan is to stay nonweightbearing with left upper extremity in sling for 6 weeks and outpatient follow-up with orthopedics.  Monitor lymphedema with frequent weights suspect it may be related to his akinesis noted on echocardiogram and IV hydration that he  received.  He is status post angioplasty and on dual antiplatelet treatment with both aspirin as well as Plavix.  Denies any chest pain understands that he has chest pain to the hospital he is on medical management with a low-dose of metoprolol as well as lisinopril.  Consider adding diuretics if he continues to have weight gain./Worsening lymphedema/concerned about pulmonary edema.  Patient has received a new diagnosis of ischemic cardiomyopathy with systolic heart failure.  He is currently on medical management with outpatient follow-up with cardiology for repeat echocardiogram to reassess ejection fraction  Continues to have low blood pressures in the TCU  He did receive 1 unit of blood transfusion monitor hemoglobin closely in the TCU  FOR Thrombophlebitis he is on doxycycline for 5 days  Pain management reviewed with him and he does not prefer to take narcotics we will schedule some low-dose of Tylenol at 650 mg 1 p.o. 3 times daily and monitor response.  Check routine labs including a CBC BMP and mag in a few days  He has an underlying history of rheumatoid arthritis but currently methotrexate has been discontinued along with his immunosuppressants at the recommendation of orthopedics for at least 4-6 weeks  Due to underlying history of chronic back pain he is on gabapentin  Due to ongoing concerns about tobacco abuse cessation was recommended for him strongly    History     Patient is a very pleasant 73 y.o. male who is admitted to TCU  Patient was electively admitted to the hospital for a left total reverse shoulder arthroplasty 1/16/19.  He has a history of a fall with resulting left proximal humerus fracture.  It was initially treated conservatively but repeat imaging in 2 weeks showed increasing displacement for which he underwent shoulder arthroplasty.  Currently discharged nonweightbearing on his left upper extremity in a sling.    Postoperative course complicated by symptoms of increasing chest pain  and chest pressure.  EKG revealed inferior STEMI  Cardiology was consulted.  He underwent an angiogram which revealed 50% stenoses with a miD LAD.  This is being managed medically with outpatient functional testing in 4-6 weeks  He also had 100% stenosis of the proximal circumflex for which stenting was done  Currently is chest pain-free.    Repeat echo showed mild inferior wall posterior wall and mid lateral segment and basal lateral segment Akinesis.  He has been diagnosed with congestive heart failure with systolic dysfunction due to ischemic cardiomyopathy.  Discharged on dual antiplatelet treatment with aspirin and Plavix.    Patient became hypotensive on .  Unfortunately remained refractory to fluid boluses.  He was also given a septic workup because of low-grade temp and received IV antibiotics.  This did resolve spontaneously.    He was noted to have significant anemia with a drop in hemoglobin from 9.2-7.1 unit of blood transfusion  Discharge to the TCU pain management reviewed he is doing well and wants to use primarily Tylenol  Ambulating without any assist device      Past Medical History       Past Medical History:   Diagnosis Date   ? CAD (coronary artery disease)    ? Carpal tunnel syndrome, bilateral    ? Closed fracture of distal end of left humerus    ? DM2 (diabetes mellitus, type 2) (H)    ? Elevated C-reactive protein    ? Hiatal hernia    ? RA (rheumatoid arthritis) (H)    ? STEMI (ST elevation myocardial infarction) (H)    ? Thrombophlebitis        Past Social History     Reviewed, and he  reports that he has been smoking.  he has never used smokeless tobacco. He reports that he does not drink alcohol.    Family History     Reviewed, and states his parents  of old age his grandfather  of a CVA    Medication List     Current Outpatient Medications on File Prior to Visit   Medication Sig Dispense Refill   ? aspirin 81 MG EC tablet Take 81 mg by mouth daily.     ? atorvastatin  (LIPITOR) 40 MG tablet Take 40 mg by mouth at bedtime.     ? clopidogrel (PLAVIX) 75 mg tablet Take 75 mg by mouth daily.     ? folic acid (FOLVITE) 1 MG tablet Take 1 mg by mouth daily.     ? gabapentin (NEURONTIN) 100 MG capsule Take 200 mg by mouth 3 (three) times a day.     ? HYDROcodone-acetaminophen 5-325 mg per tablet Take 1-2 tablets by mouth every 4 (four) hours as needed for pain.     ? lisinopril (PRINIVIL,ZESTRIL) 2.5 MG tablet Take 2.5 mg by mouth daily.     ? metoprolol succinate (TOPROL-XL) 25 MG Take 25 mg by mouth daily.     ? minocycline (MINOCIN,DYNACIN) 100 MG capsule Take 100 mg by mouth 2 (two) times a day.     ? nitroglycerin (NITROSTAT) 0.4 MG SL tablet Place 0.4 mg under the tongue every 5 (five) minutes as needed for chest pain.       No current facility-administered medications on file prior to visit.        Allergies     Allergies   Allergen Reactions   ? Penicillins Hives   ? Sulfa (Sulfonamide Antibiotics) Hives       Review of Systems   A comprehensive review of 14 systems was done. Pertinent findings noted here and in history of present illness. All the rest negative.  Constitutional: Negative.  Negative for fever, chills, he has activity change, appetite change and fatigue.   HENT: Negative for congestion and facial swelling.    Eyes: Negative for photophobia, redness and visual disturbance.   Respiratory: Negative for cough and chest tightness.    Cardiovascular: Negative for chest pain, palpitations and has leg swelling.   Gastrointestinal: Negative for nausea, diarrhea, constipation, blood in stool and abdominal distention.   Genitourinary: Negative.    Musculoskeletal: Has left shoulder pain but taking pain pills minimally    Skin: Negative.    Neurological: Negative for dizziness, tremors, syncope, weakness, light-headedness and headaches.   Hematological: Does not bruise/bleed easily.   Psychiatric/Behavioral: Negative.  Stable mood        Physical Exam     Weight is 207  pounds, blood pressure 107/71 temp 98 pulse 67    Constitutional: Oriented to person, place, and time and appears well-developed.   HEENT:  Normocephalic and atraumatic.  Eyes: Conjunctivae and EOM are normal. Pupils are equal, round, and reactive to light. No discharge.  No scleral icterus. Nose normal. Mouth/Throat: Oropharynx is clear and moist. No oropharyngeal exudate.    NECK: Normal range of motion. Neck supple. No JVD present. No tracheal deviation present. No thyromegaly present.   CARDIOVASCULAR: Normal rate, regular rhythm and intact distal pulses.  Exam reveals no gallop and no friction rub.  Systolic murmur present.  PULMONARY: Effort normal and breath sounds normal. No respiratory distress.No Wheezing or rales.  ABDOMEN: Soft. Bowel sounds are normal. No distension and no mass.  There is no tenderness. There is no rebound and no guarding. No HSM.  MUSCULOSKELETAL: Normal range of motion. 2+ edema and no tenderness. Mild kyphosis, no tenderness.  Left upper extremity is in a sling.  Surgical incision on his left shoulder is intact with Steri-Strips present no erythema no drainage noted  LYMPH NODES: Has no cervical, supraclavicular, axillary and groin adenopathy.   NEUROLOGICAL: Alert and oriented to person, place, and time. No cranial nerve deficit.  Normal muscle tone. Coordination normal.   GENITOURINARY: Deferred exam.  SKIN: Skin is warm and dry. No rash noted. No erythema. No pallor.  Has a  peeling scaly rash noted on his right hand patient thinks his dry skin   EXTREMITIES: No cyanosis, no clubbing, 2+ edema. No Deformity.  PSYCHIATRIC: Normal mood, affect and behavior.      Lab Results     Complete Blood Count:  CBC   Recent Labs   01/21/19  0605 01/18/19  0335   WBC -- -- 6.7   HGB 9.7 L < > 7.9 L   HCT -- -- 25.5 L   MCV 98 < > 103 H   MCH -- -- 31.9   MCHC -- -- 31.0 L   RDW -- -- 14.4   PLT -- -- 224   < > = values in this interval not displayed.       BASIC METABOLIC PANEL:  Recent Labs    01/20/19  0543 01/17/19  0340   SODIUM 138 -- --   POTASSIUM 3.6 -- 4.2   CHLORIDE 104 -- --   OD1VISBY 26 -- --   ANIONGAP 8 -- --   BUN 13 -- --   CREATININE 0.72 < > --   GLUCOSE 110 H -- --   CALCIUM 8.5 -- --   MAGNESIUM -- -- 1.6   < > = values in this interval not displayed.     GFR:  Recent Labs   01/20/19  0543   GFRAFRICAN >60   GFRNOTAFRICA >60         Imaging Results       Imaging:  Limited echo 1/18/19:  Final Impressions:  Limited Echocardiogram performed  1. Normal LV size, normal wall thickness, mildly reduced global systolic function with an estimated EF of 40 - 45%.  2. Inferior wall, posterior wall, mid lateral segment, and basal lateral segment are akinetic.  3. Right ventricular cavity size is normal, global systolic RV function is normal.  4. The aortic valve is sclerotic, no stenosis and no regurgitation.  5. The mitral valve is sclerotic, no mitral regurgitation.  6. The inferior vena cava is dilated, respiratory size variation less than 50%, consistent with elevated right atrial pressure.  7. Severely elevated right atrial pressure.  8. Compared to echo on 1/17/19 the RA pressure is slightly higher.    Echo 1/17/19:  Final Impressions:  1. Normal LV size, normal wall thickness, moderately reduced global systolic function with an estimated EF of 45 - 50%.  2. Mid and distal lateral wall, posterior wall, and basal lateral segment are abnormal (hypokinetic).  3. The mitral valve is sclerotic, no mitral regurgitation.  4. Grade 1 pattern of LV diastolic filling.  5. Echo contrast was administrered to enhance visualization of all left ventricular segments.        NIKHIL Luque

## 2021-06-20 NOTE — LETTER
Letter by Erik Le NP at      Author: Erik Le NP Service: -- Author Type: --    Filed:  Encounter Date: 12/18/2019 Status: Signed         Patient: Hugh Berman Jr.   MR Number: [id number]   YOB: 1945   Date of Visit: 12/18/2019     [facility]      Facility:    [facility] [[id number]]  Code Status: POLST AVAILABLE      Chief Complaint/Reason for Visit:   Chief Complaint   Patient presents with   ? Review Of Multiple Medical Conditions       HPI:   Hugh is a 74 y.o. male who is recent transfer from [facility] admission on 12/7/2019 and discharged on 12/11/2019.  He has a past medical history of RA and MI after his shoulder surgery, hypertension who was admitted for a new right femoral neck fracture.  Apparently he lost his balance and fell at high V landing on his right hip.  Imaging showed right femoral neck fracture, underwent hemo-arthroplasty of right hip on 12/8/2019 for closed displaced fracture of right femoral neck, also pathology tissue exam was ordered per fragility fracture.  Apparently he has history of spinal stenosis, underwent head CT, unremarkable.  He was given weightbearing as tolerated, advised to start Forteo or biphosphonate's with an addition of calcium citrate and vitamin D.  He is also on aspirin and Plavix for CAD management, Tylenol and oxycodone for pain control. His last Hgb 10.1.  He was then discharged to TCU for rehab.    Today will assess pain control, constipation issues and therapy progress.    Past Medical History:  Past Medical History:   Diagnosis Date   ? CAD (coronary artery disease)    ? Carpal tunnel syndrome, bilateral    ? Closed fracture of distal end of left humerus    ? Closed fracture of neck of right femur (H)    ? Dislocation of left shoulder joint    ? DM2 (diabetes mellitus, type 2) (H)    ? Elevated C-reactive protein    ? Hiatal hernia    ? Internal derangement of  left shoulder    ? Lower extremity edema    ? Prediabetes    ? RA (rheumatoid arthritis) (H)    ? STEMI (ST elevation myocardial infarction) (H)    ? Thrombophlebitis            Surgical History:  Past Surgical History:   Procedure Laterality Date   ? CORONARY STENT PLACEMENT  2019   ? HEMIARTHROPLASTY HIP Right 2019   ? REVERSE TOTAL SHOULDER ARTHROPLASTY Left 2019   ? WISDOM TOOTH EXTRACTION  1966       Family History:   No family history on file.    Social History:    Social History     Socioeconomic History   ? Marital status: Single     Spouse name: Not on file   ? Number of children: Not on file   ? Years of education: Not on file   ? Highest education level: Not on file   Occupational History   ? Not on file   Social Needs   ? Financial resource strain: Not on file   ? Food insecurity:     Worry: Not on file     Inability: Not on file   ? Transportation needs:     Medical: Not on file     Non-medical: Not on file   Tobacco Use   ? Smoking status: Former Smoker     Packs/day: 0.10     Years: 35.00     Pack years: 3.50     Types: Cigarettes     Last attempt to quit: 2019     Years since quittin.9   ? Smokeless tobacco: Never Used   Substance and Sexual Activity   ? Alcohol use: No   ? Drug use: Not on file   ? Sexual activity: Not on file   Lifestyle   ? Physical activity:     Days per week: Not on file     Minutes per session: Not on file   ? Stress: Not on file   Relationships   ? Social connections:     Talks on phone: Not on file     Gets together: Not on file     Attends Taoist service: Not on file     Active member of club or organization: Not on file     Attends meetings of clubs or organizations: Not on file     Relationship status: Not on file   ? Intimate partner violence:     Fear of current or ex partner: Not on file     Emotionally abused: Not on file     Physically abused: Not on file     Forced sexual activity: Not on file   Other Topics Concern   ? Not on file    Social History Narrative   ? Not on file          Review of Systems   Constitutional: Positive for activity change. Negative for appetite change, chills, diaphoresis, fatigue and fever.        No concerns   HENT: Negative for congestion and hearing loss.    Eyes: Negative.    Respiratory: Negative for shortness of breath and wheezing.    Cardiovascular: Negative.    Gastrointestinal: Negative for abdominal distention, abdominal pain, constipation, diarrhea and nausea.   Endocrine: Negative.    Genitourinary: Negative for difficulty urinating.   Musculoskeletal:        R LE pain improved   Skin: Positive for wound.        R hip   Allergic/Immunologic: Negative.    Neurological: Negative for dizziness, tremors and light-headedness.   Hematological: Negative.    Psychiatric/Behavioral: Positive for sleep disturbance. Negative for agitation, confusion and hallucinations.       Vitals:    12/18/19 1926   BP: 99/62   Pulse: 66   Resp: 16   Temp: 98  F (36.7  C)   SpO2: 97%   Weight: 180 lb (81.6 kg)       Physical Exam  Vitals signs reviewed.   Constitutional:       Comments: Pain control improved, has insomnia   HENT:      Head: Normocephalic and atraumatic.      Right Ear: External ear normal.      Left Ear: External ear normal.      Nose: No congestion or rhinorrhea.      Mouth/Throat:      Mouth: Mucous membranes are moist.      Pharynx: No oropharyngeal exudate or posterior oropharyngeal erythema.   Eyes:      General:         Right eye: No discharge.         Left eye: No discharge.   Neck:      Musculoskeletal: Normal range of motion and neck supple.      Comments: Intact  Cardiovascular:      Rate and Rhythm: Normal rate.      Heart sounds: No murmur. No friction rub. No gallop.    Pulmonary:      Effort: No respiratory distress.      Breath sounds: Normal breath sounds. No wheezing or rales.      Comments: CTA, room air  Abdominal:      General: Bowel sounds are normal. There is no distension.      Palpations:  Abdomen is soft.      Tenderness: There is no abdominal tenderness. There is no guarding.      Comments: Has constipation   Genitourinary:     Comments: No limitations  Musculoskeletal:         General: No swelling.      Right lower leg: No edema.      Left lower leg: No edema.      Comments: R hip pain more controlled, using walker   Skin:     General: Skin is warm and dry.      Comments: Old drainage on Aquacel dressing, intact   Neurological:      Mental Status: He is alert and oriented to person, place, and time.   Psychiatric:         Mood and Affect: Mood normal.      Comments: Denies anxiety or depression         Medication List:  Current Outpatient Medications   Medication Sig   ? acetaminophen (TYLENOL) 325 MG tablet Take 650 mg by mouth 4 (four) times a day.    ? aspirin 81 MG EC tablet Take 81 mg by mouth daily.   ? atorvastatin (LIPITOR) 40 MG tablet Take 40 mg by mouth at bedtime.   ? CALCIUM CITRATE ORAL Take 600 mg by mouth 2 (two) times a day.   ? cholecalciferol, vitamin D3, 5,000 unit Tab Take 1 tablet by mouth daily.   ? clopidogrel (PLAVIX) 75 mg tablet Take 75 mg by mouth daily.   ? hydrOXYzine pamoate (VISTARIL) 25 MG capsule Take 25 mg by mouth 4 (four) times a day. Give with tylenol   ? melatonin 3 mg Tab tablet Take 6 mg by mouth at bedtime.   ? metoprolol succinate (TOPROL-XL) 25 MG Take 12.5 mg by mouth daily.          ? nitroglycerin (NITROSTAT) 0.4 MG SL tablet Place 0.4 mg under the tongue every 5 (five) minutes as needed for chest pain.   ? oxyCODONE (ROXICODONE) 5 MG immediate release tablet Take 5 mg by mouth every 4 (four) hours as needed for pain.   ? polyethylene glycol (MIRALAX) 17 gram packet Take 17 g by mouth daily.       Labs:  Results for orders placed or performed in visit on 12/18/19   Basic Metabolic Panel   Result Value Ref Range    Sodium 137 136 - 145 mmol/L    Potassium 4.1 3.5 - 5.0 mmol/L    Chloride 101 98 - 107 mmol/L    CO2 29 22 - 31 mmol/L    Anion Gap,  Calculation 7 5 - 18 mmol/L    Glucose 96 70 - 125 mg/dL    Calcium 9.5 8.5 - 10.5 mg/dL    BUN 16 8 - 28 mg/dL    Creatinine 0.73 0.70 - 1.30 mg/dL    GFR MDRD Af Amer >60 >60 mL/min/1.73m2    GFR MDRD Non Af Amer >60 >60 mL/min/1.73m2     Lab Results   Component Value Date    WBC 6.2 12/18/2019    HGB 10.7 (L) 12/18/2019    HCT 34.0 (L) 12/18/2019    MCV 99 12/18/2019     12/18/2019     Vitamin D, Total (25-Hydroxy)   Date Value Ref Range Status   12/16/2019 22.0 (L) 30.0 - 80.0 ng/mL Final     Lab Results   Component Value Date    TSH 2.28 12/16/2019     A1c 5.5      Assessment/Plan:    Fragility fracture resultant right femoral neck fracture, underwent hemo-arthroplasty of right hip on 12/8/2019: WBAT, monitor aquacel drsg, surgeon suggested calcium citrate 600 mg twice daily, D3 5000 units daily and treatment for osteoporosis to be addressed by PCP (biphosphonate or Forteo)    ABLA: last Hgb 10.7 on 12/18    Pain control: continue Tylenol 650 4 times daily with Vistaril 4 times daily and aggressive icing.  Continue oxycodone 5 mg every 4 hours as needed (using 2-3x daily    DVT prophylaxis with history of CAD: Continue aspirin 81 mg, Plavix daily and atorvastatin 40mg daily    HTN: continue metoprolol succinate 25mg daily, SBP <120    Insomnia: continue melatonin 6 mg at bedtime, some improvment    Constipation: Started MiraLAX daily, effective    Disposition: Plans to return to home. Plans to leave next week after working on stairs.  Pending cognitive assessment      Electronically signed by: Erik Le NP

## 2021-06-20 NOTE — LETTER
Letter by Ayala Amezcua MBBS at      Author: Ayala Amezcua MBBS Service: -- Author Type: --    Filed:  Encounter Date: 12/23/2019 Status: Signed         Patient: Hugh Berman Jr.   MR Number: [id number]   YOB: 1945   Date of Visit: 12/23/2019       [facility] Admission note      Patient: Hugh Berman Jr.  MRN: [id number]  Date of Service:  12/23 /19      [facility] [[id number]]  Reason for Visit     Chief Complaint   Patient presents with   ? Review Of Multiple Medical Conditions       Code Status     FULL CODE    Assessment     -History of a mechanical fall with resultant right femur fracture status post ORIF;  right hip hemiarthroplasty on 12/8/2019  -Fragility fracture with underlying history of osteoporosis suspected  -Pain management  -Underlying history of ischemic cardiomyopathy with severe coronary artery disease recently was in the hospital and underwent stenting with angioplasty in January 2019  -History of rheumatoid arthritis currently has been weaned off all his DMARD agent as per patient  -Chronic lymphedema bilateral lower extremities  -History of prediabetes  - ABLA  -History of recurrent falls with patient reporting 4 falls in 2 weeks  - PERSISTENT HYPOTENSION IN TCU    Plan     Patient has been admitted to the TCU.  WBAT  Continue with his incisional cares and follow-up with orthopedics as scheduled  Continue PT/OT  -As he has had a fragility fracture it has been recommended he goes for outpatient DEXA scan and osteoporosis treatment recommended for him.  Due to low vitamin D levels he has been now on 5000 units daily.  He is also on Liborio-Citrate 600 twice daily.  Pain management optimized adding Vistaril 25 every 6 hours to his regimen with Tylenol.  Due to insomnia concerns melatonin 6 mg has been added.  Due to constipation he is on now on MiraLAX.  Noted to be ambulating using a walker though he has gait instability.  Recheck hemoglobin has  improved to 10.7 on recheck labs on 12/18/2019.  Recheck electrolytes and kidney functions were normal.  Continue with his PT OT and rehab refill on oxycodone given-interval increase from every 4 hours to 6 hours.  Patient remains quite hypotensive persistently with low blood pressures he is not on any significant amount of hypertensives other than a low-dose of metoprolol 25 mg half tab that is 12.5 mg daily.    History     Patient is a very pleasant 74 y.o. male who is admitted to TCU  Patient presents to the hospital after a fall this was felt to be purely mechanical fall as per the patient.  Unfortunately he has significant and profound gait instability as per him and has been falling more he fell 4 times in 2 weeks prior to presenting to the hospital.  Gait remains unstable and he is using a walker and a cane for ambulation but requires assistance.HE remains a falls risk  Imaging revealed he had a right femur fracture for which she was consulted with orthopedics.  They recommended surgical repair.  He has been discharged weightbearing as tolerated  Incision is healing well.  Pain management reviewed with him he has been discharged on Tylenol as needed and oxycodone as needed he is reporting some pain with movement but overall he feels his pain is adequately controlled.  Refill requested on oxycodone.  He does report more pain post therapy and was advised aggressive icing continue with his pain regimen  He does have underlying history of ischemic cardiomyopathy and recently was in the hospital where he had stenting done.  He remains on aspirin and Plavix this will be continued for DVT prophylaxis also  Recheck labs came back and are all stable recheck hemoglobin is 10.3 electrolytes are stable kidney functions are normal  bp continue to run low and he is only on a low dose of metoprolol    Past Medical History     Active Ambulatory (Non-Hospital) Problems    Diagnosis   ? Fracture of neck of right femur (H)   ?  Essential hypertension   ? Adjustment insomnia   ? Closed fracture of proximal end of left humerus with routine healing   ? Acute ST elevation myocardial infarction (STEMI) of inferior wall (H)   ? Anticoagulation adequate   ? Lymphedema   ? Anemia   ? Acute systolic heart failure (H)   ? Pain   ? Rheumatoid arthritis involving multiple sites (H)     Past Medical History:   Diagnosis Date   ? CAD (coronary artery disease)    ? Carpal tunnel syndrome, bilateral    ? Closed fracture of distal end of left humerus    ? Closed fracture of neck of right femur (H)    ? Dislocation of left shoulder joint    ? DM2 (diabetes mellitus, type 2) (H)    ? Elevated C-reactive protein    ? Hiatal hernia    ? Internal derangement of left shoulder    ? Lower extremity edema    ? Prediabetes    ? RA (rheumatoid arthritis) (H)    ? STEMI (ST elevation myocardial infarction) (H)    ? Thrombophlebitis        Past Social History     Reviewed, and he  reports that he quit smoking about 11 months ago. His smoking use included cigarettes. He has a 3.50 pack-year smoking history. He has never used smokeless tobacco. He reports that he does not drink alcohol.    Family History     Reviewed, and includes a history of CVA in his grandfather    Medication List   Post Discharge Medication Reconciliation Status: discharge medications reconciled and changed, per note/orders (see AVS)   Current Outpatient Medications on File Prior to Visit   Medication Sig Dispense Refill   ? acetaminophen (TYLENOL) 325 MG tablet Take 650 mg by mouth 4 (four) times a day.      ? aspirin 81 MG EC tablet Take 81 mg by mouth daily.     ? atorvastatin (LIPITOR) 40 MG tablet Take 40 mg by mouth at bedtime.     ? CALCIUM CITRATE ORAL Take 600 mg by mouth 2 (two) times a day.     ? cholecalciferol, vitamin D3, 5,000 unit Tab Take 1 tablet by mouth daily.     ? clopidogrel (PLAVIX) 75 mg tablet Take 75 mg by mouth daily.     ? hydrOXYzine pamoate (VISTARIL) 25 MG capsule  Take 25 mg by mouth 4 (four) times a day. Give with tylenol     ? melatonin 3 mg Tab tablet Take 6 mg by mouth at bedtime.     ? metoprolol succinate (TOPROL-XL) 25 MG Take 12.5 mg by mouth daily.            ? nitroglycerin (NITROSTAT) 0.4 MG SL tablet Place 0.4 mg under the tongue every 5 (five) minutes as needed for chest pain.     ? oxyCODONE (ROXICODONE) 5 MG immediate release tablet Take 5 mg by mouth every 4 (four) hours as needed for pain.     ? polyethylene glycol (MIRALAX) 17 gram packet Take 17 g by mouth daily.       No current facility-administered medications on file prior to visit.        Allergies     Allergies   Allergen Reactions   ? Penicillins Hives   ? Sulfa (Sulfonamide Antibiotics) Hives       Review of Systems   A comprehensive review of 14 systems was done. Pertinent findings noted here and in history of present illness. All the rest negative.  Constitutional: Negative.  Negative for fever, chills, reporting activity change, appetite change and fatigue.   HENT: Negative for congestion and facial swelling.    Eyes: Negative for photophobia, redness and visual disturbance.   Respiratory: Negative for cough and chest tightness.    Cardiovascular: Negative for chest pain, palpitations and has chronic leg swelling.   Gastrointestinal: Negative for nausea, diarrhea, having some constipation,NO  blood in stool and abdominal distention.   Genitourinary: Negative.    Musculoskeletal: Negative.  Ports that he is developed gait instability he fell 4 times in 2 weeks  Has pain in his right hip which is slightly worse today as per him  Skin: Negative.    Neurological: Negative for dizziness, tremors, syncope, weakness, light-headedness and headaches.   Hematological: Does not bruise/bleed easily.   Psychiatric/Behavioral: Negative.        Physical Exam     Recent Vitals 12/18/2019   Weight 180 lbs   BP 99/62   Pulse 66   Temp 98   SpO2 97       Constitutional: Oriented to person, place, and time and  appears well-developed.   HEENT:  Normocephalic and atraumatic.  Eyes: Conjunctivae and EOM are normal. Pupils are equal, round, and reactive to light. No discharge.  No scleral icterus. Nose normal. Mouth/Throat: Oropharynx is clear and moist. No oropharyngeal exudate.    NECK: Normal range of motion. Neck supple. No JVD present. No tracheal deviation present. No thyromegaly present.   CARDIOVASCULAR: Normal rate, regular rhythm and intact distal pulses.  Exam reveals no gallop and no friction rub.  Systolic murmur present.  PULMONARY: Effort normal and breath sounds normal. No respiratory distress.No Wheezing or rales.  ABDOMEN: Soft. Bowel sounds are normal. No distension and no mass.  There is no tenderness. There is no rebound and no guarding. No HSM.  MUSCULOSKELETAL: Normal range of motion.  1+ leg edema and no tenderness. Mild kyphosis, no tenderness.  Right hip with an intact surgical dressing with Mepilex noted-this was removed and incision was examined no drainage noted staples are intact the patient reassured and the dressing was reapplied  LYMPH NODES: Has no cervical, supraclavicular, axillary and groin adenopathy.   NEUROLOGICAL: Alert and oriented to person, place, and time. No cranial nerve deficit.  Normal muscle tone. Coordination normal.   GENITOURINARY: Deferred exam.  SKIN: Skin is warm and dry. No rash noted. No erythema. No pallor.   EXTREMITIES: No cyanosis, no clubbing, has 1+ leg edema. No Deformity.  PSYCHIATRIC: Normal mood, affect and behavior.      Lab Results     Recent Results (from the past 240 hour(s))   Basic Metabolic Panel   Result Value Ref Range    Sodium 139 136 - 145 mmol/L    Potassium 4.4 3.5 - 5.0 mmol/L    Chloride 106 98 - 107 mmol/L    CO2 25 22 - 31 mmol/L    Anion Gap, Calculation 8 5 - 18 mmol/L    Glucose 101 70 - 125 mg/dL    Calcium 8.6 8.5 - 10.5 mg/dL    BUN 21 8 - 28 mg/dL    Creatinine 0.71 0.70 - 1.30 mg/dL    GFR MDRD Af Amer >60 >60 mL/min/1.73m2    GFR  MDRD Non Af Amer >60 >60 mL/min/1.73m2   Magnesium   Result Value Ref Range    Magnesium 1.9 1.8 - 2.6 mg/dL   Thyroid Stimulating Hormone (TSH)   Result Value Ref Range    TSH 2.28 0.30 - 5.00 uIU/mL   Vitamin D, Total (25-Hydroxy)   Result Value Ref Range    Vitamin D, Total (25-Hydroxy) 22.0 (L) 30.0 - 80.0 ng/mL   HM2(CBC w/o Differential)   Result Value Ref Range    WBC 6.0 4.0 - 11.0 thou/uL    RBC 3.30 (L) 4.40 - 6.20 mill/uL    Hemoglobin 10.3 (L) 14.0 - 18.0 g/dL    Hematocrit 32.6 (L) 40.0 - 54.0 %    MCV 99 80 - 100 fL    MCH 31.2 27.0 - 34.0 pg    MCHC 31.6 (L) 32.0 - 36.0 g/dL    RDW 13.5 11.0 - 14.5 %    Platelets 323 140 - 440 thou/uL    MPV 9.7 8.5 - 12.5 fL   Basic Metabolic Panel   Result Value Ref Range    Sodium 137 136 - 145 mmol/L    Potassium 4.1 3.5 - 5.0 mmol/L    Chloride 101 98 - 107 mmol/L    CO2 29 22 - 31 mmol/L    Anion Gap, Calculation 7 5 - 18 mmol/L    Glucose 96 70 - 125 mg/dL    Calcium 9.5 8.5 - 10.5 mg/dL    BUN 16 8 - 28 mg/dL    Creatinine 0.73 0.70 - 1.30 mg/dL    GFR MDRD Af Amer >60 >60 mL/min/1.73m2    GFR MDRD Non Af Amer >60 >60 mL/min/1.73m2   Magnesium   Result Value Ref Range    Magnesium 2.0 1.8 - 2.6 mg/dL   HM2(CBC w/o Differential)   Result Value Ref Range    WBC 6.2 4.0 - 11.0 thou/uL    RBC 3.45 (L) 4.40 - 6.20 mill/uL    Hemoglobin 10.7 (L) 14.0 - 18.0 g/dL    Hematocrit 34.0 (L) 40.0 - 54.0 %    MCV 99 80 - 100 fL    MCH 31.0 27.0 - 34.0 pg    MCHC 31.5 (L) 32.0 - 36.0 g/dL    RDW 13.6 11.0 - 14.5 %    Platelets 345 140 - 440 thou/uL    MPV 9.7 8.5 - 12.5 fL         NIKHIL Luque

## 2021-06-20 NOTE — LETTER
Letter by Erik Le NP at      Author: Erik Le NP Service: -- Author Type: --    Filed:  Encounter Date: 12/13/2019 Status: Signed         Patient: Hugh Berman Jr.   MR Number: [id number]   YOB: 1945   Date of Visit: 12/13/2019     [facility]      Facility:    [facility] [[id number]]  Code Status: POLST AVAILABLE      Chief Complaint/Reason for Visit:   Chief Complaint   Patient presents with   ? Review Of Multiple Medical Conditions       HPI:   Hugh is a 74 y.o. male who is recent transfer from [facility] admission on 12/7/2019 and discharged on 12/11/2019.  He has a past medical history of RA and MI after his shoulder surgery, hypertension who was admitted for a new right femoral neck fracture.  Apparently he lost his balance and fell at high V landing on his right hip.  Imaging showed right femoral neck fracture, underwent hemo-arthroplasty of right hip on 12/8/2019 for closed displaced fracture of right femoral neck, also pathology tissue exam was ordered per fragility fracture.  Apparently he has history of spinal stenosis, underwent head CT, unremarkable.  He was given weightbearing as tolerated, advised to start Forteo or biphosphonate's with an addition of calcium citrate and vitamin D.  He is also on aspirin and Plavix for CAD management, Tylenol and oxycodone for pain control. His last Hgb 10.1.  He was then discharged to TCU for rehab.    Past Medical History:  Past Medical History:   Diagnosis Date   ? CAD (coronary artery disease)    ? Carpal tunnel syndrome, bilateral    ? Closed fracture of distal end of left humerus    ? Closed fracture of neck of right femur (H)    ? Dislocation of left shoulder joint    ? DM2 (diabetes mellitus, type 2) (H)    ? Elevated C-reactive protein    ? Hiatal hernia    ? Internal derangement of left shoulder    ? Lower extremity edema    ? Prediabetes    ? RA (rheumatoid  arthritis) (H)    ? STEMI (ST elevation myocardial infarction) (H)    ? Thrombophlebitis            Surgical History:  Past Surgical History:   Procedure Laterality Date   ? CORONARY STENT PLACEMENT  2019   ? HEMIARTHROPLASTY HIP Right 2019   ? REVERSE TOTAL SHOULDER ARTHROPLASTY Left 2019   ? WISDOM TOOTH EXTRACTION  1966       Family History:   No family history on file.    Social History:    Social History     Socioeconomic History   ? Marital status: Single     Spouse name: Not on file   ? Number of children: Not on file   ? Years of education: Not on file   ? Highest education level: Not on file   Occupational History   ? Not on file   Social Needs   ? Financial resource strain: Not on file   ? Food insecurity:     Worry: Not on file     Inability: Not on file   ? Transportation needs:     Medical: Not on file     Non-medical: Not on file   Tobacco Use   ? Smoking status: Former Smoker     Packs/day: 0.10     Years: 35.00     Pack years: 3.50     Types: Cigarettes     Last attempt to quit: 2019     Years since quittin.9   ? Smokeless tobacco: Never Used   Substance and Sexual Activity   ? Alcohol use: No   ? Drug use: Not on file   ? Sexual activity: Not on file   Lifestyle   ? Physical activity:     Days per week: Not on file     Minutes per session: Not on file   ? Stress: Not on file   Relationships   ? Social connections:     Talks on phone: Not on file     Gets together: Not on file     Attends Orthodox service: Not on file     Active member of club or organization: Not on file     Attends meetings of clubs or organizations: Not on file     Relationship status: Not on file   ? Intimate partner violence:     Fear of current or ex partner: Not on file     Emotionally abused: Not on file     Physically abused: Not on file     Forced sexual activity: Not on file   Other Topics Concern   ? Not on file   Social History Narrative   ? Not on file          Review of Systems    Constitutional: Positive for activity change. Negative for appetite change, chills, diaphoresis, fatigue and fever.        Pain control   HENT: Negative for congestion and hearing loss.    Eyes: Negative.    Respiratory: Negative for shortness of breath and wheezing.    Cardiovascular: Negative.    Gastrointestinal: Positive for constipation. Negative for abdominal distention, abdominal pain, diarrhea and nausea.   Endocrine: Negative.    Genitourinary: Negative for difficulty urinating.   Musculoskeletal:        R LE pain   Skin: Positive for wound.        R hip   Allergic/Immunologic: Negative.    Neurological: Negative for dizziness, tremors and light-headedness.   Hematological: Negative.    Psychiatric/Behavioral: Positive for sleep disturbance. Negative for agitation, confusion and hallucinations.       Vitals:    12/13/19 1254   BP: 98/63   Pulse: 60   Resp: 16   Temp: 97  F (36.1  C)   SpO2: 94%   Weight: 181 lb (82.1 kg)       Physical Exam  Vitals signs reviewed.   Constitutional:       Comments: Pain control   HENT:      Head: Normocephalic and atraumatic.      Right Ear: External ear normal.      Left Ear: External ear normal.      Nose: No congestion or rhinorrhea.      Mouth/Throat:      Mouth: Mucous membranes are moist.      Pharynx: No oropharyngeal exudate or posterior oropharyngeal erythema.   Eyes:      General:         Right eye: No discharge.         Left eye: No discharge.   Neck:      Musculoskeletal: Normal range of motion and neck supple.      Comments: Intact  Cardiovascular:      Rate and Rhythm: Normal rate.      Heart sounds: No murmur. No friction rub. No gallop.    Pulmonary:      Effort: No respiratory distress.      Breath sounds: Normal breath sounds. No wheezing or rales.      Comments: CTA, room air  Abdominal:      General: Bowel sounds are normal. There is no distension.      Palpations: Abdomen is soft.      Tenderness: There is no abdominal tenderness. There is no guarding.       Comments: Has constipation   Genitourinary:     Comments: No limitations  Musculoskeletal:         General: Swelling present.      Right lower leg: No edema.      Left lower leg: No edema.      Comments: Localized edema to right hip, pain associated, weightbearing as tolerated   Skin:     General: Skin is warm and dry.      Comments: Old drainage on Aquacel dressing, intact   Neurological:      Mental Status: He is alert and oriented to person, place, and time.   Psychiatric:         Mood and Affect: Mood normal.      Comments: Denies anxiety or depression         Medication List:  Current Outpatient Medications   Medication Sig   ? CALCIUM CITRATE ORAL Take 600 mg by mouth 2 (two) times a day.   ? cholecalciferol, vitamin D3, 5,000 unit Tab Take 1 tablet by mouth daily.   ? hydrOXYzine pamoate (VISTARIL) 25 MG capsule Take 25 mg by mouth 4 (four) times a day. Give with tylenol   ? melatonin 3 mg Tab tablet Take 6 mg by mouth at bedtime.   ? polyethylene glycol (MIRALAX) 17 gram packet Take 17 g by mouth daily.   ? acetaminophen (TYLENOL) 325 MG tablet Take 650 mg by mouth 4 (four) times a day.    ? aspirin 81 MG EC tablet Take 81 mg by mouth daily.   ? atorvastatin (LIPITOR) 40 MG tablet Take 40 mg by mouth at bedtime.   ? clopidogrel (PLAVIX) 75 mg tablet Take 75 mg by mouth daily.   ? metoprolol succinate (TOPROL-XL) 25 MG Take 12.5 mg by mouth daily.          ? nitroglycerin (NITROSTAT) 0.4 MG SL tablet Place 0.4 mg under the tongue every 5 (five) minutes as needed for chest pain.   ? oxyCODONE (ROXICODONE) 5 MG immediate release tablet Take 5 mg by mouth every 4 (four) hours as needed for pain.       Labs:  Na 140  K 4.1  Cr 0.63  GFR >60  WBC 7.7  Hgb 10.1  MCV 98  A1c 5.5      Assessment/Plan:    Fragility fracture resultant right femoral neck fracture, underwent hemo-arthroplasty of right hip on 12/8/2019: WBAT, monitor [facility] drsg, surgeon suggested calcium citrate 600 mg twice daily, D3 5000 units  daily and treatment for osteoporosis to be addressed by PCP (biphosphonate or Forteo)    ABLA: last Hgb 10.1, recheck CBC    Pain control: Increase Tylenol to 650 4 times daily with Vistaril 4 times daily and aggressive icing.  Continue oxycodone 5 mg every 4 hours as needed    DVT prophylaxis with history of CAD: Continue aspirin 81 mg, Plavix daily and atorvastatin 40mg daily    HTN: continue metoprolol succinate 25mg daily, SBP <120    Insomnia: Start melatonin 6 mg at bedtime    Constipation: Start MiraLAX daily    Labs: Recheck BMP/CBC/mag/TSH/vitamin D    Disposition: Plans to return to home. Plans to leave next week after working on stairs.  Pending cognitive assessment    The care plan has been reviewed and all orders signed. Changes to care plan, if any, as noted. Otherwise, continue care plan of care.  The total time spent with this patient 35 minutes, with greater than 50% spent in counseling and coordination of care that included multiple issues    Post Discharge Medication Reconciliation Status: discharge medications reconciled and changed, per note/orders (see AVS)      Electronically signed by: Erik Le NP

## 2021-06-20 NOTE — LETTER
Letter by Ayala Amezcua MBBS at      Author: Ayala Amezcua MBBS Service: -- Author Type: --    Filed:  Encounter Date: 12/16/2019 Status: Signed         Patient: Hugh Berman Jr.   MR Number: [id number]   YOB: 1945   Date of Visit: 12/16/2019       [facility] Admission note      Patient: Hugh Berman Jr.  MRN: [id number]  Date of Service:  12/11/19      [facility] [[id number]]  Reason for Visit     Chief Complaint   Patient presents with   ? Review Of Multiple Medical Conditions       Code Status     FULL CODE    Assessment     -History of a mechanical fall with resultant right femur fracture status post ORIF;  right hip hemiarthroplasty on 12/8/2019  -Fragility fracture with underlying history of osteoporosis suspected  -Pain management  -Underlying history of ischemic cardiomyopathy with severe coronary artery disease recently was in the hospital and underwent stenting with angioplasty in January 2019  -History of rheumatoid arthritis currently has been weaned off all his DMARD agent as per patient  -Chronic lymphedema bilateral lower extremities  -History of prediabetes  - ABLA  -History of recurrent falls with patient reporting 4 falls in 2 weeks    Plan     Patient has been admitted to the TCU.  WBAT  Continue with his incisional cares and follow-up with orthopedics as scheduled  Continue PT/OT  -As he has had a fragility fracture it has been recommended he goes for outpatient DEXA scan and osteoporosis treatment once he is discharged in the TCU this was reviewed with him  Empirically start him on vitamin D 2000 units daily  Due to increased pain concerns incision was examined and seems to be intact with no drainage or dehiscence noted so patient was reassured advised aggressive icing and continue with his pain regimen.  He is ambulating with therapy using a walker and a cane but remains debilitated at baseline  Recheck labs from today were all reviewed.   All within normal limits with a hemoglobin of 10.3 and other electrolytes and kidney functions.  Patient does have a low vitamin D level but he has been already empirically started on 5000 units daily on admission so continue with the same with no change  Continue with his PT OT and rehab    History     Patient is a very pleasant 74 y.o. male who is admitted to TCU  Patient presents to the hospital after a fall this was felt to be purely mechanical fall as per the patient.  Unfortunately he has significant and profound gait instability as per him and has been falling more he fell 4 times in 2 weeks prior to presenting to the hospital.  Gait remains unstable and he is using a walker and a cane for ambulation but requires assistance  Imaging revealed he had a right femur fracture for which she was consulted with orthopedics.  They recommended surgical repair.  He has been discharged weightbearing as tolerated  He was reporting more pain and drainage at the incision site which was examined staples are intact with no drainage noted and patient was reassured  Pain management reviewed with him he has been discharged on Tylenol as needed and oxycodone as needed he is reporting some pain with movement but overall he feels his pain is adequately controlled.  He does report more pain post therapy and was advised aggressive icing continue with his pain regimen  He does have underlying history of ischemic cardiomyopathy and recently was in the hospital where he had stenting done.  He remains on aspirin and Plavix this will be continued for DVT prophylaxis also  Recheck labs came back and are all stable recheck hemoglobin is 10.3 electrolytes are stable kidney functions are normal      Past Medical History     Active Ambulatory (Non-Hospital) Problems    Diagnosis   ? Fracture of neck of right femur (H)   ? Essential hypertension   ? Adjustment insomnia   ? Closed fracture of proximal end of left humerus with routine healing   ?  Acute ST elevation myocardial infarction (STEMI) of inferior wall (H)   ? Anticoagulation adequate   ? Lymphedema   ? Anemia   ? Acute systolic heart failure (H)   ? Pain   ? Rheumatoid arthritis involving multiple sites (H)     Past Medical History:   Diagnosis Date   ? CAD (coronary artery disease)    ? Carpal tunnel syndrome, bilateral    ? Closed fracture of distal end of left humerus    ? Closed fracture of neck of right femur (H)    ? Dislocation of left shoulder joint    ? DM2 (diabetes mellitus, type 2) (H)    ? Elevated C-reactive protein    ? Hiatal hernia    ? Internal derangement of left shoulder    ? Lower extremity edema    ? Prediabetes    ? RA (rheumatoid arthritis) (H)    ? STEMI (ST elevation myocardial infarction) (H)    ? Thrombophlebitis        Past Social History     Reviewed, and he  reports that he quit smoking about 10 months ago. His smoking use included cigarettes. He has a 3.50 pack-year smoking history. He has never used smokeless tobacco. He reports that he does not drink alcohol.    Family History     Reviewed, and includes a history of CVA in his grandfather    Medication List   Post Discharge Medication Reconciliation Status: discharge medications reconciled and changed, per note/orders (see AVS)   Current Outpatient Medications on File Prior to Visit   Medication Sig Dispense Refill   ? acetaminophen (TYLENOL) 325 MG tablet Take 650 mg by mouth 4 (four) times a day.      ? aspirin 81 MG EC tablet Take 81 mg by mouth daily.     ? atorvastatin (LIPITOR) 40 MG tablet Take 40 mg by mouth at bedtime.     ? CALCIUM CITRATE ORAL Take 600 mg by mouth 2 (two) times a day.     ? cholecalciferol, vitamin D3, 5,000 unit Tab Take 1 tablet by mouth daily.     ? clopidogrel (PLAVIX) 75 mg tablet Take 75 mg by mouth daily.     ? hydrOXYzine pamoate (VISTARIL) 25 MG capsule Take 25 mg by mouth 4 (four) times a day. Give with tylenol     ? melatonin 3 mg Tab tablet Take 6 mg by mouth at bedtime.      ? metoprolol succinate (TOPROL-XL) 25 MG Take 12.5 mg by mouth daily.            ? nitroglycerin (NITROSTAT) 0.4 MG SL tablet Place 0.4 mg under the tongue every 5 (five) minutes as needed for chest pain.     ? oxyCODONE (ROXICODONE) 5 MG immediate release tablet Take 5 mg by mouth every 4 (four) hours as needed for pain.     ? polyethylene glycol (MIRALAX) 17 gram packet Take 17 g by mouth daily.       No current facility-administered medications on file prior to visit.        Allergies     Allergies   Allergen Reactions   ? Penicillins Hives   ? Sulfa (Sulfonamide Antibiotics) Hives       Review of Systems   A comprehensive review of 14 systems was done. Pertinent findings noted here and in history of present illness. All the rest negative.  Constitutional: Negative.  Negative for fever, chills, reporting activity change, appetite change and fatigue.   HENT: Negative for congestion and facial swelling.    Eyes: Negative for photophobia, redness and visual disturbance.   Respiratory: Negative for cough and chest tightness.    Cardiovascular: Negative for chest pain, palpitations and has chronic leg swelling.   Gastrointestinal: Negative for nausea, diarrhea, having some constipation,NO  blood in stool and abdominal distention.   Genitourinary: Negative.    Musculoskeletal: Negative.  Ports that he is developed gait instability he fell 4 times in 2 weeks  Has pain in his right hip which is slightly worse today as per him  Skin: Negative.    Neurological: Negative for dizziness, tremors, syncope, weakness, light-headedness and headaches.   Hematological: Does not bruise/bleed easily.   Psychiatric/Behavioral: Negative.        Physical Exam     Recent Vitals 12/13/2019   Weight 181 lbs   BP 98/63   Pulse 60   Temp 97   SpO2 94     Blood pressure 110/68 pulse 80 temp 98 and weights 173LB  Constitutional: Oriented to person, place, and time and appears well-developed.   HEENT:  Normocephalic and atraumatic.  Eyes:  Conjunctivae and EOM are normal. Pupils are equal, round, and reactive to light. No discharge.  No scleral icterus. Nose normal. Mouth/Throat: Oropharynx is clear and moist. No oropharyngeal exudate.    NECK: Normal range of motion. Neck supple. No JVD present. No tracheal deviation present. No thyromegaly present.   CARDIOVASCULAR: Normal rate, regular rhythm and intact distal pulses.  Exam reveals no gallop and no friction rub.  Systolic murmur present.  PULMONARY: Effort normal and breath sounds normal. No respiratory distress.No Wheezing or rales.  ABDOMEN: Soft. Bowel sounds are normal. No distension and no mass.  There is no tenderness. There is no rebound and no guarding. No HSM.  MUSCULOSKELETAL: Normal range of motion.  1+ leg edema and no tenderness. Mild kyphosis, no tenderness.  Right hip with an intact surgical dressing with Mepilex noted-this was removed and incision was examined no drainage noted staples are intact the patient reassured and the dressing was reapplied  LYMPH NODES: Has no cervical, supraclavicular, axillary and groin adenopathy.   NEUROLOGICAL: Alert and oriented to person, place, and time. No cranial nerve deficit.  Normal muscle tone. Coordination normal.   GENITOURINARY: Deferred exam.  SKIN: Skin is warm and dry. No rash noted. No erythema. No pallor.   EXTREMITIES: No cyanosis, no clubbing, has 1+ leg edema. No Deformity.  PSYCHIATRIC: Normal mood, affect and behavior.      Lab Results     Discharge hemoglobin was 10.1.  INR was 1.2.  Platelets 163.  ; K 3.9 ;CR 0.7      NIKHIL Luque

## 2021-06-20 NOTE — LETTER
Letter by Erik Le NP at      Author: Erik Le NP Service: -- Author Type: --    Filed:  Encounter Date: 12/26/2019 Status: Signed         Patient: Hugh Berman Jr.   MR Number: [id number]   YOB: 1945   Date of Visit: 12/26/2019     [facility]      Facility:    [facility] [[id number]]  Code Status: POLST AVAILABLE      Chief Complaint/Reason for Visit:   No chief complaint on file.      HPI:   Hugh is a 74 y.o. male who is recent transfer from [facility] admission on 12/7/2019 and discharged on 12/11/2019.  He has a past medical history of RA and MI after his shoulder surgery, hypertension who was admitted for a new right femoral neck fracture.  Apparently he lost his balance and fell at high V landing on his right hip.  Imaging showed right femoral neck fracture, underwent hemo-arthroplasty of right hip on 12/8/2019 for closed displaced fracture of right femoral neck, also pathology tissue exam was ordered per fragility fracture.  Apparently he has history of spinal stenosis, underwent head CT, unremarkable.  He was given weightbearing as tolerated, advised to start Forteo or biphosphonate's with an addition of calcium citrate and vitamin D.  He is also on aspirin and Plavix for CAD management, Tylenol and oxycodone for pain control. His last Hgb 10.1.  He was then discharged to TCU for rehab.    He has concluded his TCU stay will be discharged to home with services on 12/27/2019.    Past Medical History:  Past Medical History:   Diagnosis Date   ? CAD (coronary artery disease)    ? Carpal tunnel syndrome, bilateral    ? Closed fracture of distal end of left humerus    ? Closed fracture of neck of right femur (H)    ? Dislocation of left shoulder joint    ? DM2 (diabetes mellitus, type 2) (H)    ? Elevated C-reactive protein    ? Hiatal hernia    ? Internal derangement of left shoulder    ? Lower extremity edema    ?  Prediabetes    ? RA (rheumatoid arthritis) (H)    ? STEMI (ST elevation myocardial infarction) (H)    ? Thrombophlebitis            Surgical History:  Past Surgical History:   Procedure Laterality Date   ? CORONARY STENT PLACEMENT  2019   ? HEMIARTHROPLASTY HIP Right 2019   ? REVERSE TOTAL SHOULDER ARTHROPLASTY Left 2019   ? WISDOM TOOTH EXTRACTION  1966       Family History:   No family history on file.    Social History:    Social History     Socioeconomic History   ? Marital status: Single     Spouse name: Not on file   ? Number of children: Not on file   ? Years of education: Not on file   ? Highest education level: Not on file   Occupational History   ? Not on file   Social Needs   ? Financial resource strain: Not on file   ? Food insecurity:     Worry: Not on file     Inability: Not on file   ? Transportation needs:     Medical: Not on file     Non-medical: Not on file   Tobacco Use   ? Smoking status: Former Smoker     Packs/day: 0.10     Years: 35.00     Pack years: 3.50     Types: Cigarettes     Last attempt to quit: 2019     Years since quittin.9   ? Smokeless tobacco: Never Used   Substance and Sexual Activity   ? Alcohol use: No   ? Drug use: Not on file   ? Sexual activity: Not on file   Lifestyle   ? Physical activity:     Days per week: Not on file     Minutes per session: Not on file   ? Stress: Not on file   Relationships   ? Social connections:     Talks on phone: Not on file     Gets together: Not on file     Attends Anabaptist service: Not on file     Active member of club or organization: Not on file     Attends meetings of clubs or organizations: Not on file     Relationship status: Not on file   ? Intimate partner violence:     Fear of current or ex partner: Not on file     Emotionally abused: Not on file     Physically abused: Not on file     Forced sexual activity: Not on file   Other Topics Concern   ? Not on file   Social History Narrative   ? Not on file           Review of Systems   Constitutional: Positive for activity change. Negative for appetite change, chills, diaphoresis, fatigue and fever.        No concerns   HENT: Negative for congestion and hearing loss.    Eyes: Negative.    Respiratory: Negative for shortness of breath and wheezing.    Cardiovascular: Negative.    Gastrointestinal: Negative for abdominal distention, abdominal pain, constipation, diarrhea and nausea.   Endocrine: Negative.    Genitourinary: Negative for difficulty urinating.   Musculoskeletal:        R LE pain improved   Skin: Positive for wound.        R hip   Allergic/Immunologic: Negative.    Neurological: Negative for dizziness, tremors and light-headedness.   Hematological: Negative.    Psychiatric/Behavioral: Positive for sleep disturbance. Negative for agitation, confusion and hallucinations.       Vitals:    12/26/19 1028   BP: 105/67   Pulse: 68   Resp: 17   Temp: 98  F (36.7  C)   SpO2: 94%   Weight: 179 lb (81.2 kg)       Physical Exam  Vitals signs reviewed.   Constitutional:       Comments: No acute issues   HENT:      Head: Normocephalic and atraumatic.      Right Ear: External ear normal.      Left Ear: External ear normal.      Nose: No congestion or rhinorrhea.      Mouth/Throat:      Mouth: Mucous membranes are moist.      Pharynx: No oropharyngeal exudate or posterior oropharyngeal erythema.   Eyes:      General:         Right eye: No discharge.         Left eye: No discharge.   Neck:      Musculoskeletal: Normal range of motion and neck supple.      Comments: Intact  Cardiovascular:      Rate and Rhythm: Normal rate.      Heart sounds: No murmur. No friction rub. No gallop.    Pulmonary:      Effort: No respiratory distress.      Breath sounds: Normal breath sounds. No wheezing or rales.      Comments: CTA, room air  Abdominal:      General: Bowel sounds are normal. There is no distension.      Palpations: Abdomen is soft.      Tenderness: There is no abdominal  tenderness. There is no guarding.      Comments: Constipation improved   Genitourinary:     Comments: No limitations  Musculoskeletal:         General: No swelling or deformity.      Right lower leg: No edema.      Left lower leg: No edema.      Comments: R hip pain more controlled, using walker   Skin:     General: Skin is warm and dry.      Comments: Intact R hip incision   Neurological:      Mental Status: He is alert and oriented to person, place, and time.   Psychiatric:         Mood and Affect: Mood normal.      Comments: Denies anxiety or depression         Medication List:  Current Outpatient Medications   Medication Sig   ? acetaminophen (TYLENOL) 500 MG tablet Take 500 mg by mouth every 4 (four) hours as needed for pain. Max 4000mg/day   ? acetaminophen (TYLENOL) 325 MG tablet Take 650 mg by mouth 4 (four) times a day.    ? aspirin 81 MG EC tablet Take 81 mg by mouth daily.   ? atorvastatin (LIPITOR) 40 MG tablet Take 40 mg by mouth at bedtime.   ? CALCIUM CITRATE ORAL Take 600 mg by mouth 2 (two) times a day.   ? cholecalciferol, vitamin D3, 5,000 unit Tab Take 1 tablet by mouth daily.   ? clopidogrel (PLAVIX) 75 mg tablet Take 75 mg by mouth daily.   ? hydrOXYzine pamoate (VISTARIL) 25 MG capsule Take 25 mg by mouth 4 (four) times a day. Give with tylenol   ? melatonin 3 mg Tab tablet Take 6 mg by mouth at bedtime.   ? metoprolol succinate (TOPROL-XL) 25 MG Take 12.5 mg by mouth daily.          ? nitroglycerin (NITROSTAT) 0.4 MG SL tablet Place 0.4 mg under the tongue every 5 (five) minutes as needed for chest pain.   ? oxyCODONE (ROXICODONE) 5 MG immediate release tablet Take 2.5 mg by mouth every 6 (six) hours as needed for pain.    ? polyethylene glycol (MIRALAX) 17 gram packet Take 17 g by mouth daily.       Labs:  Results for orders placed or performed in visit on 12/18/19   Basic Metabolic Panel   Result Value Ref Range    Sodium 137 136 - 145 mmol/L    Potassium 4.1 3.5 - 5.0 mmol/L    Chloride  101 98 - 107 mmol/L    CO2 29 22 - 31 mmol/L    Anion Gap, Calculation 7 5 - 18 mmol/L    Glucose 96 70 - 125 mg/dL    Calcium 9.5 8.5 - 10.5 mg/dL    BUN 16 8 - 28 mg/dL    Creatinine 0.73 0.70 - 1.30 mg/dL    GFR MDRD Af Amer >60 >60 mL/min/1.73m2    GFR MDRD Non Af Amer >60 >60 mL/min/1.73m2     Lab Results   Component Value Date    WBC 6.2 12/18/2019    HGB 10.7 (L) 12/18/2019    HCT 34.0 (L) 12/18/2019    MCV 99 12/18/2019     12/18/2019     Vitamin D, Total (25-Hydroxy)   Date Value Ref Range Status   12/16/2019 22.0 (L) 30.0 - 80.0 ng/mL Final     Lab Results   Component Value Date    TSH 2.28 12/16/2019     A1c 5.5      Assessment/Plan:    Fragility fracture resultant right femoral neck fracture, underwent hemo-arthroplasty of right hip on 12/8/2019: WBAT, surgeon suggested calcium citrate 600 mg twice daily, D3 5000 units daily and treatment for osteoporosis to be addressed by PCP (biphosphonate or Forteo)    ABLA: last Hgb 10.7 on 12/18    Pain control: continue Tylenol 650 4 times daily with Vistaril 4 times daily.  Continue oxycodone 2.5 mg every 6 hours as needed, controlled    DVT prophylaxis with history of CAD: Continue aspirin 81 mg, Plavix daily and atorvastatin 40mg daily    HTN: continue metoprolol succinate 12.5mg daily, SBP <130    Insomnia: continue melatonin 6 mg at bedtime, some improvment    Constipation: Started MiraLAX daily, effective    Disposition: Plans to return to home with services on 12/27. [facility] 28/30      MEDICAL EQUIPMENT NEEDS:  na    DISCHARGE PLAN/FACE TO FACE:  I certify that services are/were furnished while this patient was under the care of a physician and that a physician or an allowed non-physician practitioner (NPP), had a face-to-face encounter that meets the physician face-to-face encounter requirements. The encounter was in whole, or in part, related to the primary reason for home health. The patient is confined to his/her home and needs intermittent skilled  nursing, physical therapy, speech-language pathology, or the continued need for occupational therapy. A plan of care has been established by a physician and is periodically reviewed by a physician.  Date of Face-to-Face Encounter: 12/26/19    I certify that, based on my findings, the following services are medically necessary home health services: [facility] HHA/RN and PT/OT to evaluate and treat.    My clinical findings support the need for the above skilled services because: patient will be discharging to home.  Patient need assistance with medication management and performing IADLs and ADLs effectively and safely at home.    Patient to re-establish plan of care with their PCP within 7 days after leaving TCU.     The care plan has been reviewed and all orders signed. Changes to care plan, if any, as noted. Otherwise, continue care plan of care.  The total time spent with this patient was 31 minutes, with greater than 50% spent in counseling and coordination of care that included multiple issues per pain control, continuing therapy and discharge, which lasted 16 minutes.      Electronically signed by: Erik Le NP

## 2021-06-20 NOTE — LETTER
Letter by Ayala Amezcua MBBS at      Author: Ayala mAezcua MBBS Service: -- Author Type: --    Filed:  Encounter Date: 12/11/2019 Status: Signed         Patient: Hugh Berman Jr.   MR Number: [id number]   YOB: 1945   Date of Visit: 12/11/2019       [facility] Admission note      Patient: Hugh Berman Jr.  MRN: [id number]  Date of Service:  12/11/19      [facility] [[id number]]  Reason for Visit     Chief Complaint   Patient presents with   ? H & P       Code Status     FULL CODE    Assessment     -History of a mechanical fall with resultant right femur fracture status post ORIF;  right hip hemiarthroplasty on 12/8/2019  -Fragility fracture with underlying history of osteoporosis suspected  -Pain management  -Underlying history of ischemic cardiomyopathy with severe coronary artery disease recently was in the hospital and underwent stenting with angioplasty in January 2019  -History of rheumatoid arthritis currently has been weaned off all his DMARD agent as per patient  -Chronic lymphedema bilateral lower extremities  -History of prediabetes  - ABLA  -History of recurrent falls with patient reporting 4 falls in 2 weeks    Plan     Patient has been admitted to the TCU.  WBAT  Continue with his incisional cares and follow-up with orthopedics as scheduled  Continue PT/OT  -As he has had a fragility fracture it has been recommended he goes for outpatient DEXA scan and osteoporosis treatment once he is discharged in the TCU this was reviewed with him  Empirically start him on vitamin D 2000 units daily  Pain management reviewed and we will schedule him on Tylenol 650 mg 4 times daily  Minimize narcotics advised aggressive icing at least 3 times a day and as needed as needed  Continue on his aspirin as well as Plavix with underlying history of coronary artery disease he denies any recent chest pain  Tubigrip stockings are offered for lymphedema patient does not want  them as yet  Monitor blood pressures he is has a prior history of hypertension he is on metoprolol  Blood pressures low at 110/68.  Recheck routine labs.    History     Patient is a very pleasant 74 y.o. male who is admitted to TCU  Patient presents to the hospital after a fall this was felt to be purely mechanical fall as per the patient.  Unfortunately he has significant and profound gait instability as per him and has been falling more he fell 4 times in 2 weeks prior to presenting to the hospital.  Imaging revealed he had a right femur fracture for which she was consulted with orthopedics.  They recommended surgical repair.  He has been discharged weightbearing as tolerated  Pain management reviewed with him he has been discharged on Tylenol as needed and oxycodone as needed he is reporting some pain with movement but overall he feels his pain is adequately controlled.  He does have underlying history of ischemic cardiomyopathy and recently was in the hospital where he had stenting done.  He remains on aspirin and Plavix this will be continued for DVT prophylaxis also      Past Medical History     Active Ambulatory (Non-Hospital) Problems    Diagnosis   ? Closed fracture of proximal end of left humerus with routine healing   ? Acute ST elevation myocardial infarction (STEMI) of inferior wall (H)   ? Anticoagulation adequate   ? Lymphedema   ? Anemia   ? Acute systolic heart failure (H)   ? Pain   ? Rheumatoid arthritis involving multiple sites (H)     Past Medical History:   Diagnosis Date   ? CAD (coronary artery disease)    ? Carpal tunnel syndrome, bilateral    ? Closed fracture of distal end of left humerus    ? Closed fracture of neck of right femur (H)    ? Dislocation of left shoulder joint    ? DM2 (diabetes mellitus, type 2) (H)    ? Elevated C-reactive protein    ? Hiatal hernia    ? Internal derangement of left shoulder    ? Lower extremity edema    ? Prediabetes    ? RA (rheumatoid arthritis) (H)     ? STEMI (ST elevation myocardial infarction) (H)    ? Thrombophlebitis        Past Social History     Reviewed, and he  reports that he quit smoking about 10 months ago. His smoking use included cigarettes. He has a 3.50 pack-year smoking history. He has never used smokeless tobacco. He reports that he does not drink alcohol.    Family History     Reviewed, and includes a history of CVA in his grandfather    Medication List   Post Discharge Medication Reconciliation Status: discharge medications reconciled and changed, per note/orders (see AVS)   Current Outpatient Medications on File Prior to Visit   Medication Sig Dispense Refill   ? acetaminophen (TYLENOL) 325 MG tablet Take 650 mg by mouth 4 (four) times a day.      ? aspirin 81 MG EC tablet Take 81 mg by mouth daily.     ? atorvastatin (LIPITOR) 40 MG tablet Take 40 mg by mouth at bedtime.     ? clopidogrel (PLAVIX) 75 mg tablet Take 75 mg by mouth daily.     ? metoprolol succinate (TOPROL-XL) 25 MG Take 12.5 mg by mouth daily.            ? nitroglycerin (NITROSTAT) 0.4 MG SL tablet Place 0.4 mg under the tongue every 5 (five) minutes as needed for chest pain.     ? [DISCONTINUED] folic acid (FOLVITE) 1 MG tablet Take 1 mg by mouth daily.     ? [DISCONTINUED] gabapentin (NEURONTIN) 100 MG capsule Take 200 mg by mouth 3 (three) times a day.     ? [DISCONTINUED] lidocaine (LMX) 4 % cream Apply topically 3 (three) times a day. Apply to R shoulder/arm       No current facility-administered medications on file prior to visit.        Allergies     Allergies   Allergen Reactions   ? Penicillins Hives   ? Sulfa (Sulfonamide Antibiotics) Hives       Review of Systems   A comprehensive review of 14 systems was done. Pertinent findings noted here and in history of present illness. All the rest negative.  Constitutional: Negative.  Negative for fever, chills, reporting activity change, appetite change and fatigue.   HENT: Negative for congestion and facial swelling.     Eyes: Negative for photophobia, redness and visual disturbance.   Respiratory: Negative for cough and chest tightness.    Cardiovascular: Negative for chest pain, palpitations and has chronic leg swelling.   Gastrointestinal: Negative for nausea, diarrhea, having some constipation,NO  blood in stool and abdominal distention.   Genitourinary: Negative.    Musculoskeletal: Negative.  Ports that he is developed gait instability he fell 4 times in 2 weeks  Has pain in his right hip  Skin: Negative.    Neurological: Negative for dizziness, tremors, syncope, weakness, light-headedness and headaches.   Hematological: Does not bruise/bleed easily.   Psychiatric/Behavioral: Negative.        Physical Exam     Recent Vitals 2/5/2019   Weight 200 lbs   /73   Pulse 66   Temp 98   SpO2 97     Blood pressure 110/68 pulse 80 temp 98 and weights 173LB  Constitutional: Oriented to person, place, and time and appears well-developed.   HEENT:  Normocephalic and atraumatic.  Eyes: Conjunctivae and EOM are normal. Pupils are equal, round, and reactive to light. No discharge.  No scleral icterus. Nose normal. Mouth/Throat: Oropharynx is clear and moist. No oropharyngeal exudate.    NECK: Normal range of motion. Neck supple. No JVD present. No tracheal deviation present. No thyromegaly present.   CARDIOVASCULAR: Normal rate, regular rhythm and intact distal pulses.  Exam reveals no gallop and no friction rub.  Systolic murmur present.  PULMONARY: Effort normal and breath sounds normal. No respiratory distress.No Wheezing or rales.  ABDOMEN: Soft. Bowel sounds are normal. No distension and no mass.  There is no tenderness. There is no rebound and no guarding. No HSM.  MUSCULOSKELETAL: Normal range of motion.  1+ leg edema and no tenderness. Mild kyphosis, no tenderness.  Right hip with an intact surgical dressing with Mepilex noted  LYMPH NODES: Has no cervical, supraclavicular, axillary and groin adenopathy.   NEUROLOGICAL: Alert  and oriented to person, place, and time. No cranial nerve deficit.  Normal muscle tone. Coordination normal.   GENITOURINARY: Deferred exam.  SKIN: Skin is warm and dry. No rash noted. No erythema. No pallor.   EXTREMITIES: No cyanosis, no clubbing, has 1+ leg edema. No Deformity.  PSYCHIATRIC: Normal mood, affect and behavior.      Lab Results     Discharge hemoglobin was 10.1.  INR was 1.2.  Platelets 163.  ; K 3.9 ;CR 0.7      NIKHIL Luque

## 2021-06-23 NOTE — PROGRESS NOTES
[facility]    Facility:   [facility] [[id number]]   Code Status: DNR/DNI  PCP: Provider, No Primary Care   Phone: None   Fax: None      CHIEF COMPLAINT/REASON FOR VISIT:  Chief Complaint   Patient presents with     Review Of Multiple Medical Conditions       HISTORY COURSE:  Patient was admitted with a closed fracture of the left proximal humerus status post left reverse total shoulder arthroplasty on 1/16/2019.  He continues to be nonweightbearing on his left upper extremity with a limited immobilizer.  Pain concerns are minimal he is on Tylenol alone.  Incision was examined in its intact with no drainage.  Patient has a significant history of coronary artery disease with ischemic cardiomyopathy and lower extremity lymphedema weights have improved to 200 pounds.  Unfortunately is limited by significant hypotension and has not been able to tolerate much of diuretics due to that.  He continues to have lymphedema in his legs though.  He is asymptomatic and not short of breath.  He is on a lower dose of metoprolol and on a very low-dose of lisinopril upon discharge at 2.5 mg.  He understands he needs an outpatient cardiology follow-up for management of hypertension coronary artery disease as well as congestive heart failure and needs outpatient functional testing.  Norco was discontinued for pain management due to no pain    Review of Systems    Constitutional: Negative.  Negative for fever, chills, HAS activity change, appetite change and fatigue.   HENT: Negative for congestion and facial swelling.    Eyes: Negative for photophobia, redness and visual disturbance.   Respiratory: Negative for cough and chest tightness.    Cardiovascular: Negative for chest pain, palpitations and has leg swelling.   Gastrointestinal: Negative for nausea, diarrhea, constipation, blood in stool and abdominal distention.   Genitourinary: Negative.    Musculoskeletal: Negative.  Reporting  minimal pain in back as well as the shoulder  Skin: Negative.    Neurological: Negative for dizziness, tremors, syncope, weakness, light-headedness and headaches.   Hematological: Does not bruise/bleed easily.   Psychiatric/Behavioral: Negative.      Physical Exam   Weight is 200 pounds, blood pressure 117/72 temp 98 pulse 82  Constitutional: Oriented to person, place, and time and appears well-developed.   HEENT:  Normocephalic and atraumatic.  Eyes: Conjunctivae and EOM are normal. Pupils are equal, round, and reactive to light. No discharge.  No scleral icterus. Nose normal. Mouth/Throat: Oropharynx is clear and moist. No oropharyngeal exudate.    NECK: Normal range of motion. Neck supple. No JVD present. No tracheal deviation present. No thyromegaly present.   CARDIOVASCULAR: Normal rate, regular rhythm and intact distal pulses.  Exam reveals no gallop and no friction rub.  Systolic murmur present.  PULMONARY: Effort normal and breath sounds normal. No respiratory distress.No Wheezing or rales.  ABDOMEN: Soft. Bowel sounds are normal. No distension and no mass.  There is no tenderness. There is no rebound and no guarding. No HSM.  MUSCULOSKELETAL: Normal range of motion. No edema and no tenderness. Mild kyphosis, no tenderness.  Left shoulder surgical incision is intact arm is in a sling  LYMPH NODES: Has no cervical, supraclavicular, axillary and groin adenopathy.   NEUROLOGICAL: Alert and oriented to person, place, and time. No cranial nerve deficit.  Normal muscle tone. Coordination normal.   GENITOURINARY: Deferred exam.  SKIN: Skin is warm and dry. No rash noted. No erythema. No pallor.   EXTREMITIES: No cyanosis, no clubbing, no edema. No Deformity.  PSYCHIATRIC: Normal mood, affect and behavior.    MEDICATION LIST:  Updated Medication list, printed and signed at discharge, please refer to that final medication list from the Skilled Nursing Facility for accuracy.    Lisinopril 2.5 at bedtime added.  This  was discontinued due to low blood pressures in the TCU.  Methotrexate to be resumed only after doing okay by orthopedics  Current Outpatient Medications   Medication Sig     acetaminophen (TYLENOL) 325 MG tablet Take 650 mg by mouth every 4 (four) hours as needed for pain.     aspirin 81 MG EC tablet Take 81 mg by mouth daily.     atorvastatin (LIPITOR) 40 MG tablet Take 40 mg by mouth at bedtime.     clopidogrel (PLAVIX) 75 mg tablet Take 75 mg by mouth daily.     folic acid (FOLVITE) 1 MG tablet Take 1 mg by mouth daily.     gabapentin (NEURONTIN) 100 MG capsule Take 200 mg by mouth 3 (three) times a day.     lidocaine (LMX) 4 % cream Apply topically 3 (three) times a day. Apply to R shoulder/arm     metoprolol succinate (TOPROL-XL) 25 MG Take 12.5 mg by mouth daily.            nitroglycerin (NITROSTAT) 0.4 MG SL tablet Place 0.4 mg under the tongue every 5 (five) minutes as needed for chest pain.       DISCHARGE DIAGNOSIS:    ICD-10-CM    1. Acute systolic heart failure (H) I50.21    2. Closed fracture of proximal end of left humerus with routine healing, unspecified fracture morphology, subsequent encounter S42.202D    3. Rheumatoid arthritis involving multiple sites, unspecified rheumatoid factor presence (H) M06.9    4. Iron deficiency anemia, unspecified iron deficiency anemia type D50.9    5. Lymphedema I89.0        MEDICAL EQUIPMENT NEEDS:  None    DISCHARGE PLAN/FACE TO FACE:  I certify that services are/were furnished while this patient was under the care of a physician and that a physician or an allowed non-physician practitioner (NPP), had a face-to-face encounter that meets the physician face-to-face encounter requirements. The encounter was in whole, or in part, related to the primary reason for home health. The patient is confined to his/her home and needs intermittent skilled nursing, physical therapy, speech-language pathology, or the continued need for occupational therapy. A plan of care has been  established by a physician and is periodically reviewed by a physician.  Date of Face-to-Face Encounter: 2/6/19    I certify that, based on my findings, the following services are medically necessary home health services: Outpatient cardiac rehab    My clinical findings support the need for the above skilled services because: (Please write a brief narrative summary that describes what the RN, PT, SLP, or other services will be doing in the home. A list of diagnoses in this section does not meet the CMS requirements.)  Recent history of CAD; ischemic cardia myopathy    The patient is, or has been, under my care and I have initiated the establishment of the plan of care. This patient will be followed by a physician who will periodically review the plan of care.    Schedule follow up visit with primary care provider within 7 days to reestablish care.  Follow-up with cardiology discussed low blood pressures as well as lymphedema concerns.  He will need outpatient follow-up with cardiology for follow-up of his congestive heart failure as well as CAD.  Follow-up with orthopedics to discuss weightbearing status on his left shoulder  Electronically signed by: NIKHIL Luque

## 2021-06-23 NOTE — PROGRESS NOTES
[facility]      Facility:    [facility] [[id number]]  Code Status: POLST AVAILABLE      Chief Complaint/Reason for Visit:       Chief Complaint   Patient presents with     Review Of Multiple Medical Conditions       HPI:   Hugh is a 73 y.o. male who is a recent transfer from [facility] with admission on 1/16/19 and discharged on 1/21/19.  He has a past medical history of closed fracture of left proximal humerus, prediabetes, rheumatoid arthritis who underwent left reverse total shoulder arthroplasty on 1/16/19 by Dr. Gabriella Younger.  He developed chest pain postoperatively, EKG revealed inferior STEMI, underwent PTCA found 100% stenosis of proximal circumflex, MERCEDEZ placed also 50% stenosis of mild LAD.  Cardiology recommended stress test in 4-6 weeks.  Repeat echo showed inferior wall posterior wall mid lateral segment and basal late lateral segment akinesis.  Aspirin and Plavix started.  He then developed hypotension and LGT that was remedied with fluid boluses and started on vancomycin and cefepime per sepsis protocol.  He also received 1U PRBCs, dripped down to 7.2 and then given another unit. He stabilized prior to discharge to the TCU.      Today he has limited concerns, previously found to have spontaneous L shoulder dislocation, has f/u today and he is wearing his brace. Patient denies pain, headache, chest pain, numbness or tingling, shortest of breath, eating or swallowing concerns, nausea or vomiting, diarrhea or bowel abnormalities, or no new integumentary concerns today.     Past Medical History:  Past Medical History:   Diagnosis Date     CAD (coronary artery disease)      Carpal tunnel syndrome, bilateral      Closed fracture of distal end of left humerus      DM2 (diabetes mellitus, type 2) (H)      Elevated C-reactive protein      Hiatal hernia      RA (rheumatoid arthritis) (H)      STEMI (ST elevation myocardial infarction) (H)       Thrombophlebitis            Surgical History:  Past Surgical History:   Procedure Laterality Date     CORONARY STENT PLACEMENT  2019     REVERSE TOTAL SHOULDER ARTHROPLASTY Left 2019     WISDOM TOOTH EXTRACTION  1966       Family History:   No family history on file.    Social History:    Social History     Socioeconomic History     Marital status: Single     Spouse name: Not on file     Number of children: Not on file     Years of education: Not on file     Highest education level: Not on file   Social Needs     Financial resource strain: Not on file     Food insecurity - worry: Not on file     Food insecurity - inability: Not on file     Transportation needs - medical: Not on file     Transportation needs - non-medical: Not on file   Occupational History     Not on file   Tobacco Use     Smoking status: Light Tobacco Smoker     Last attempt to quit: 1982     Years since quittin.6     Smokeless tobacco: Never Used     Tobacco comment: 3 cig/day; restarted  after wife    Substance and Sexual Activity     Alcohol use: No     Drug use: Not on file     Sexual activity: Not on file   Other Topics Concern     Not on file   Social History Narrative     Not on file          Review of Systems   Constitutional: Positive for activity change. Negative for appetite change, chills, diaphoresis, fatigue and fever.        Previously found to have L shoulder dislocation   HENT: Negative for hearing loss.    Eyes: Negative for photophobia, redness and visual disturbance.   Respiratory: Negative for cough, shortness of breath and wheezing.    Cardiovascular: Negative for leg swelling.   Gastrointestinal: Negative for abdominal distention, abdominal pain, blood in stool, constipation, diarrhea and nausea.   Endocrine: Negative.    Genitourinary: Negative for dysuria.   Musculoskeletal: Negative for myalgias.        Denies pain   Skin:        intact   Allergic/Immunologic: Negative.    Neurological:  Positive for weakness. Negative for dizziness, numbness and headaches.        L shoulder in brace   Hematological: Negative.    Psychiatric/Behavioral: Negative for agitation, confusion, hallucinations and sleep disturbance. The patient is not nervous/anxious.        Vitals:    02/05/19 1122   BP: 124/73   Pulse: 66   Resp: 18   Temp: 98  F (36.7  C)   SpO2: 97%   Weight: 200 lb (90.7 kg)       Physical Exam   Constitutional: He is oriented to person, place, and time.   No acute issues   HENT:   Head: Normocephalic and atraumatic.   Mouth/Throat: Oropharynx is clear and moist. No oropharyngeal exudate.   Eyes: Right eye exhibits no discharge. Left eye exhibits no discharge. No scleral icterus.   Neck: Normal range of motion.   Cardiovascular: Normal rate. Exam reveals no gallop and no friction rub.   No murmur heard.  Pulmonary/Chest: Effort normal and breath sounds normal. No stridor. No respiratory distress. He has no wheezes. He has no rales.   Dim, RA   Abdominal: Soft. Bowel sounds are normal. He exhibits no distension. There is no tenderness. There is no rebound.   Denies constipation or diarrhea   Genitourinary:   Genitourinary Comments: deferred   Musculoskeletal: He exhibits no edema, tenderness or deformity.   L shoulder in brace   Lymphadenopathy:     He has no cervical adenopathy.   Neurological: He is oriented to person, place, and time. No cranial nerve deficit.   No deficit noted   Vitals reviewed.      Medication List:  Current Outpatient Medications   Medication Sig     acetaminophen (TYLENOL) 325 MG tablet Take 650 mg by mouth every 4 (four) hours as needed for pain.     aspirin 81 MG EC tablet Take 81 mg by mouth daily.     atorvastatin (LIPITOR) 40 MG tablet Take 40 mg by mouth at bedtime.     clopidogrel (PLAVIX) 75 mg tablet Take 75 mg by mouth daily.     folic acid (FOLVITE) 1 MG tablet Take 1 mg by mouth daily.     gabapentin (NEURONTIN) 100 MG capsule Take 200 mg by mouth 3 (three) times a  day.     lidocaine (LMX) 4 % cream Apply topically 3 (three) times a day. Apply to R shoulder/arm     metoprolol succinate (TOPROL-XL) 25 MG Take 12.5 mg by mouth daily.            nitroglycerin (NITROSTAT) 0.4 MG SL tablet Place 0.4 mg under the tongue every 5 (five) minutes as needed for chest pain.       Labs:  Results for orders placed or performed in visit on 01/25/19   Basic Metabolic Panel   Result Value Ref Range    Sodium 137 136 - 145 mmol/L    Potassium 4.0 3.5 - 5.0 mmol/L    Chloride 102 98 - 107 mmol/L    CO2 24 22 - 31 mmol/L    Anion Gap, Calculation 11 5 - 18 mmol/L    Glucose 99 70 - 125 mg/dL    Calcium 8.9 8.5 - 10.5 mg/dL    BUN 16 8 - 28 mg/dL    Creatinine 0.69 (L) 0.70 - 1.30 mg/dL    GFR MDRD Af Amer >60 >60 mL/min/1.73m2    GFR MDRD Non Af Amer >60 >60 mL/min/1.73m2     Lab Results   Component Value Date    WBC 6.7 01/25/2019    HGB 10.7 (L) 01/25/2019    HCT 33.5 (L) 01/25/2019     01/25/2019     01/25/2019     No results found for: TSH    No results found for: [id number]    No results found for: [id number]      Assessment/Plan:    Close fracture of left left reversal to his shoulder arthroplasty on 1/16/19 as an elective procedure and later found to have spontaneous L shoulder dislocation: wearing brace, follow-up with Ortho today then discharging soon    Postoperative inferior STEMI: Underwent PTCA, stent placed in proximal circumflex, also found 50% stenosis in the mid LAD, neurology recommended outpatient function testing in 4-6 weeks, continue atorvastatin 40 mg at bedtime    Acute systolic congestive heart failure with ischemic cardiomyopathy: Echocardiogram showed EF 47%    Pain control: dc'd narco, started on tylenol 650mg q4h PRN, minimal doses given    Thrombophlebitis: given doxycyline x5d, resolved    Anticoagulation: Started on Plavix and aspirin    ALBA: Given 2 units of packed red blood cells, last hemoglobin 10.7    Lymphedema: Has 1+ bilateral lower  extremity edema, monitor weights    Rheumatoid arthritis: Methotrexate has been discontinued as long with his immunosuppressants, recommendation of orthopedics for least 4-6 weeks    Fever of unclear etiology: Resolved    Prediabetes: Last A1c 5.5    Neuropathy and chronic back pain: Continue gabapentin 200 mg 3 times daily    Hypertension: per hypotension (SBP <110 in afternoon) will dc lisinopril and decrease metoprolol to 12.5mg daily    Disposition: Plans to return to daughter's home    The care plan has been reviewed and all orders signed. Changes to care plan, if any, as noted. Otherwise, continue care plan of care.  The total time spent with this patient was 25 minutes, with greater than 50% spent in counseling and coordination of care that included multiple issues per hypotension.      Electronically signed by: Erik Le NP

## 2021-06-23 NOTE — PROGRESS NOTES
[facility] Admission note      Patient: Hguh Berman Jr.  MRN: [id number]  Date of Service: 1/23/2019      [facility] [[id number]]  Reason for Visit     Chief Complaint   Patient presents with     H & P       Code Status   DNR/DNI requesting comfort focused treatment      Assessment   Close fracture of the left proximal humerus status post left reverse total shoulder arthroplasty on 1/16/19 and an elective procedure  Postoperative inferior ST SIOBHAN  Status post angioplasty with stents done secondary to 100% stenoses of the proximal circumflex artery.  50% stenosis of mid LAD for which cardiology has recommended outpatient function testing  Acute systolic congestive heart failure with ischemic cardiomyopathy  Repeat echo prior to discharge showing inferior wall, posterior wall, mid lateral segment and basal lateral segment akinesis  Currently discharged on dual antiplatelet treatment with aspirin and Plavix  Lymphedema bilateral lower extremity which is a new finding for him.  Hypotension postoperative  Anemia secondary to blood loss and dilutional effect with a drop in hemoglobin to 7.2 and resolved with 1 unit of blood transfusion  Fever of unclear etiology  Thrombophlebitis  RT HAND currently discharged on oral antibiotics  History of chronic back pain  Debilitation  History of prediabetes  Plan   Patient has been discharged to the TCU for strengthening.  Plan is to stay nonweightbearing with left upper extremity in sling for 6 weeks and outpatient follow-up with orthopedics.  Monitor lymphedema with frequent weights suspect it may be related to his akinesis noted on echocardiogram and IV hydration that he received.  He is status post angioplasty and on dual antiplatelet treatment with both aspirin as well as Plavix.  Denies any chest pain understands that he has chest pain to the hospital he is on medical management with a low-dose of metoprolol as well as lisinopril.  Consider  adding diuretics if he continues to have weight gain./Worsening lymphedema/concerned about pulmonary edema.  Patient has received a new diagnosis of ischemic cardiomyopathy with systolic heart failure.  He is currently on medical management with outpatient follow-up with cardiology for repeat echocardiogram to reassess ejection fraction  Continues to have low blood pressures in the TCU  He did receive 1 unit of blood transfusion monitor hemoglobin closely in the TCU  FOR Thrombophlebitis he is on doxycycline for 5 days  Pain management reviewed with him and he does not prefer to take narcotics we will schedule some low-dose of Tylenol at 650 mg 1 p.o. 3 times daily and monitor response.  Check routine labs including a CBC BMP and mag in a few days  He has an underlying history of rheumatoid arthritis but currently methotrexate has been discontinued along with his immunosuppressants at the recommendation of orthopedics for at least 4-6 weeks  Due to underlying history of chronic back pain he is on gabapentin  Due to ongoing concerns about tobacco abuse cessation was recommended for him strongly    History     Patient is a very pleasant 73 y.o. male who is admitted to TCU  Patient was electively admitted to the hospital for a left total reverse shoulder arthroplasty 1/16/19.  He has a history of a fall with resulting left proximal humerus fracture.  It was initially treated conservatively but repeat imaging in 2 weeks showed increasing displacement for which he underwent shoulder arthroplasty.  Currently discharged nonweightbearing on his left upper extremity in a sling.    Postoperative course complicated by symptoms of increasing chest pain and chest pressure.  EKG revealed inferior STEMI  Cardiology was consulted.  He underwent an angiogram which revealed 50% stenoses with a miD LAD.  This is being managed medically with outpatient functional testing in 4-6 weeks  He also had 100% stenosis of the proximal  circumflex for which stenting was done  Currently is chest pain-free.    Repeat echo showed mild inferior wall posterior wall and mid lateral segment and basal lateral segment Akinesis.  He has been diagnosed with congestive heart failure with systolic dysfunction due to ischemic cardiomyopathy.  Discharged on dual antiplatelet treatment with aspirin and Plavix.    Patient became hypotensive on .  Unfortunately remained refractory to fluid boluses.  He was also given a septic workup because of low-grade temp and received IV antibiotics.  This did resolve spontaneously.    He was noted to have significant anemia with a drop in hemoglobin from 9.2-7.1 unit of blood transfusion  Discharge to the TCU pain management reviewed he is doing well and wants to use primarily Tylenol  Ambulating without any assist device      Past Medical History       Past Medical History:   Diagnosis Date     CAD (coronary artery disease)      Carpal tunnel syndrome, bilateral      Closed fracture of distal end of left humerus      DM2 (diabetes mellitus, type 2) (H)      Elevated C-reactive protein      Hiatal hernia      RA (rheumatoid arthritis) (H)      STEMI (ST elevation myocardial infarction) (H)      Thrombophlebitis        Past Social History     Reviewed, and he  reports that he has been smoking.  he has never used smokeless tobacco. He reports that he does not drink alcohol.    Family History     Reviewed, and states his parents  of old age his grandfather  of a CVA    Medication List     Current Outpatient Medications on File Prior to Visit   Medication Sig Dispense Refill     aspirin 81 MG EC tablet Take 81 mg by mouth daily.       atorvastatin (LIPITOR) 40 MG tablet Take 40 mg by mouth at bedtime.       clopidogrel (PLAVIX) 75 mg tablet Take 75 mg by mouth daily.       folic acid (FOLVITE) 1 MG tablet Take 1 mg by mouth daily.       gabapentin (NEURONTIN) 100 MG capsule Take 200 mg by mouth 3 (three) times a day.        HYDROcodone-acetaminophen 5-325 mg per tablet Take 1-2 tablets by mouth every 4 (four) hours as needed for pain.       lisinopril (PRINIVIL,ZESTRIL) 2.5 MG tablet Take 2.5 mg by mouth daily.       metoprolol succinate (TOPROL-XL) 25 MG Take 25 mg by mouth daily.       minocycline (MINOCIN,DYNACIN) 100 MG capsule Take 100 mg by mouth 2 (two) times a day.       nitroglycerin (NITROSTAT) 0.4 MG SL tablet Place 0.4 mg under the tongue every 5 (five) minutes as needed for chest pain.       No current facility-administered medications on file prior to visit.        Allergies     Allergies   Allergen Reactions     Penicillins Hives     Sulfa (Sulfonamide Antibiotics) Hives       Review of Systems   A comprehensive review of 14 systems was done. Pertinent findings noted here and in history of present illness. All the rest negative.  Constitutional: Negative.  Negative for fever, chills, he has activity change, appetite change and fatigue.   HENT: Negative for congestion and facial swelling.    Eyes: Negative for photophobia, redness and visual disturbance.   Respiratory: Negative for cough and chest tightness.    Cardiovascular: Negative for chest pain, palpitations and has leg swelling.   Gastrointestinal: Negative for nausea, diarrhea, constipation, blood in stool and abdominal distention.   Genitourinary: Negative.    Musculoskeletal: Has left shoulder pain but taking pain pills minimally    Skin: Negative.    Neurological: Negative for dizziness, tremors, syncope, weakness, light-headedness and headaches.   Hematological: Does not bruise/bleed easily.   Psychiatric/Behavioral: Negative.  Stable mood        Physical Exam     Weight is 207 pounds, blood pressure 107/71 temp 98 pulse 67    Constitutional: Oriented to person, place, and time and appears well-developed.   HEENT:  Normocephalic and atraumatic.  Eyes: Conjunctivae and EOM are normal. Pupils are equal, round, and reactive to light. No discharge.  No scleral  icterus. Nose normal. Mouth/Throat: Oropharynx is clear and moist. No oropharyngeal exudate.    NECK: Normal range of motion. Neck supple. No JVD present. No tracheal deviation present. No thyromegaly present.   CARDIOVASCULAR: Normal rate, regular rhythm and intact distal pulses.  Exam reveals no gallop and no friction rub.  Systolic murmur present.  PULMONARY: Effort normal and breath sounds normal. No respiratory distress.No Wheezing or rales.  ABDOMEN: Soft. Bowel sounds are normal. No distension and no mass.  There is no tenderness. There is no rebound and no guarding. No HSM.  MUSCULOSKELETAL: Normal range of motion. 2+ edema and no tenderness. Mild kyphosis, no tenderness.  Left upper extremity is in a sling.  Surgical incision on his left shoulder is intact with Steri-Strips present no erythema no drainage noted  LYMPH NODES: Has no cervical, supraclavicular, axillary and groin adenopathy.   NEUROLOGICAL: Alert and oriented to person, place, and time. No cranial nerve deficit.  Normal muscle tone. Coordination normal.   GENITOURINARY: Deferred exam.  SKIN: Skin is warm and dry. No rash noted. No erythema. No pallor.  Has a  peeling scaly rash noted on his right hand patient thinks his dry skin   EXTREMITIES: No cyanosis, no clubbing, 2+ edema. No Deformity.  PSYCHIATRIC: Normal mood, affect and behavior.      Lab Results     Complete Blood Count:  CBC   Recent Labs   01/21/19  0605 01/18/19  0335   WBC -- -- 6.7   HGB 9.7 L < > 7.9 L   HCT -- -- 25.5 L   MCV 98 < > 103 H   MCH -- -- 31.9   MCHC -- -- 31.0 L   RDW -- -- 14.4   PLT -- -- 224   < > = values in this interval not displayed.       BASIC METABOLIC PANEL:  Recent Labs   01/20/19  0543 01/17/19  0340   SODIUM 138 -- --   POTASSIUM 3.6 -- 4.2   CHLORIDE 104 -- --   WO1BVSNH 26 -- --   ANIONGAP 8 -- --   BUN 13 -- --   CREATININE 0.72 < > --   GLUCOSE 110 H -- --   CALCIUM 8.5 -- --   MAGNESIUM -- -- 1.6   < > = values in this interval not displayed.      GFR:  Recent Labs   01/20/19  0543   GFRAFRICAN >60   GFRNOTAFRICA >60         Imaging Results       Imaging:  Limited echo 1/18/19:  Final Impressions:  Limited Echocardiogram performed  1. Normal LV size, normal wall thickness, mildly reduced global systolic function with an estimated EF of 40 - 45%.  2. Inferior wall, posterior wall, mid lateral segment, and basal lateral segment are akinetic.  3. Right ventricular cavity size is normal, global systolic RV function is normal.  4. The aortic valve is sclerotic, no stenosis and no regurgitation.  5. The mitral valve is sclerotic, no mitral regurgitation.  6. The inferior vena cava is dilated, respiratory size variation less than 50%, consistent with elevated right atrial pressure.  7. Severely elevated right atrial pressure.  8. Compared to echo on 1/17/19 the RA pressure is slightly higher.    Echo 1/17/19:  Final Impressions:  1. Normal LV size, normal wall thickness, moderately reduced global systolic function with an estimated EF of 45 - 50%.  2. Mid and distal lateral wall, posterior wall, and basal lateral segment are abnormal (hypokinetic).  3. The mitral valve is sclerotic, no mitral regurgitation.  4. Grade 1 pattern of LV diastolic filling.  5. Echo contrast was administrered to enhance visualization of all left ventricular segments.        NIKHIL Luque

## 2021-06-23 NOTE — PROGRESS NOTES
[facility]      Facility:    [facility] [[id number]]  Code Status: POLST AVAILABLE      Chief Complaint/Reason for Visit:       Chief Complaint   Patient presents with     Review Of Multiple Medical Conditions       HPI:   Hugh is a 73 y.o. male who is a recent transfer from [facility] with admission on 1/16/19 and discharged on 1/21/19.  He has a past medical history of closed fracture of leftproximal humerus, prediabetes, rheumatoid arthritis who underwent left reverse total shoulder arthroplasty on 1/16/19 by Dr. Gabriella Younger.  He developed chest pain postoperatively, EKG revealed inferior STEMI, underwent PTCA found 100% stenosis of proximal circumflex, MERCEDEZ placed also 50% stenosis of mild LAD.  Cardiology recommended stress test in 4-6 weeks.  Repeat echo showed inferior wall posterior wall mid lateral segment and basal late lateral segment akinesis.  Aspirin and Plavix started.  He then developed hypotension and LGT that was remedied with fluid boluses and started on vancomycin and cefepime per sepsis protocol.  He also received 1U PRBCs, dripped down to 7.2 and then given another unit. He stabilized prior to discharge to the TCU.      Today he has limited concerns, though still has R shoulder weakness that causes R arm pain that is being treated with lidocaine gel. This is being supplied his family. Patient denies headache, chest pain, numbness or tingling, shortest of breath, eating or swallowing concerns, nausea or vomiting, diarrhea or bowel abnormalities, or no new integumentary concerns today.  Therapy is working with increasing strength and stability.     Past Medical History:  Past Medical History:   Diagnosis Date     CAD (coronary artery disease)      Carpal tunnel syndrome, bilateral      Closed fracture of distal end of left humerus      DM2 (diabetes mellitus, type 2) (H)      Elevated C-reactive protein      Hiatal hernia      RA  (rheumatoid arthritis) (H)      STEMI (ST elevation myocardial infarction) (H)      Thrombophlebitis            Surgical History:  Past Surgical History:   Procedure Laterality Date     CORONARY STENT PLACEMENT  2019     REVERSE TOTAL SHOULDER ARTHROPLASTY Left 2019     WISDOM TOOTH EXTRACTION  1966       Family History:   No family history on file.    Social History:    Social History     Socioeconomic History     Marital status: Single     Spouse name: Not on file     Number of children: Not on file     Years of education: Not on file     Highest education level: Not on file   Social Needs     Financial resource strain: Not on file     Food insecurity - worry: Not on file     Food insecurity - inability: Not on file     Transportation needs - medical: Not on file     Transportation needs - non-medical: Not on file   Occupational History     Not on file   Tobacco Use     Smoking status: Light Tobacco Smoker     Last attempt to quit: 1982     Years since quittin.5     Smokeless tobacco: Never Used     Tobacco comment: 3 cig/day; restarted  after wife    Substance and Sexual Activity     Alcohol use: No     Drug use: Not on file     Sexual activity: Not on file   Other Topics Concern     Not on file   Social History Narrative     Not on file          Review of Systems   Constitutional: Positive for activity change and fatigue. Negative for appetite change, chills, diaphoresis and fever.   HENT: Positive for hearing loss.    Eyes: Negative for photophobia, redness and visual disturbance.   Respiratory: Positive for cough. Negative for shortness of breath and wheezing.    Cardiovascular: Negative for leg swelling.   Gastrointestinal: Negative for abdominal distention, abdominal pain, blood in stool, constipation, diarrhea and nausea.   Endocrine: Negative.    Genitourinary: Negative for dysuria.   Musculoskeletal: Positive for myalgias.        R arm/shoulder   Skin:        intact    Allergic/Immunologic: Negative.    Neurological: Positive for weakness. Negative for dizziness, numbness and headaches.        R shoulder   Hematological: Negative.    Psychiatric/Behavioral: Negative for agitation, confusion, hallucinations and sleep disturbance. The patient is not nervous/anxious.        Vitals:    01/28/19 1220   BP: 116/74   Pulse: 66   Resp: 18   Temp: 98.8  F (37.1  C)   SpO2: 98%   Weight: 208 lb (94.3 kg)       Physical Exam   Constitutional: He is oriented to person, place, and time.   No acute issues   HENT:   Head: Normocephalic and atraumatic.   Mouth/Throat: Oropharynx is clear and moist. No oropharyngeal exudate.   Eyes: Right eye exhibits no discharge. Left eye exhibits no discharge. No scleral icterus.   Neck: Normal range of motion.   Cardiovascular: Normal rate. Exam reveals no gallop and no friction rub.   No murmur heard.  Pulmonary/Chest: Effort normal and breath sounds normal. No stridor. No respiratory distress. He has no wheezes. He has no rales.   Dim, RA   Abdominal: Soft. Bowel sounds are normal. He exhibits no distension. There is no tenderness. There is no rebound.   Denies constipation or diarrhea   Genitourinary:   Genitourinary Comments: deferred   Musculoskeletal: He exhibits edema. He exhibits no tenderness or deformity.   R shoulder weakness with R arm pain, 1-2+ BLE edema, mostly pedal   Lymphadenopathy:     He has no cervical adenopathy.   Neurological: He is oriented to person, place, and time. No cranial nerve deficit.   No deficit noted   Vitals reviewed.      Medication List:  Current Outpatient Medications   Medication Sig     aspirin 81 MG EC tablet Take 81 mg by mouth daily.     atorvastatin (LIPITOR) 40 MG tablet Take 40 mg by mouth at bedtime.     clopidogrel (PLAVIX) 75 mg tablet Take 75 mg by mouth daily.     folic acid (FOLVITE) 1 MG tablet Take 1 mg by mouth daily.     gabapentin (NEURONTIN) 100 MG capsule Take 200 mg by mouth 3 (three) times a  day.     HYDROcodone-acetaminophen 5-325 mg per tablet Take 1-2 tablets by mouth every 4 (four) hours as needed for pain.     lisinopril (PRINIVIL,ZESTRIL) 2.5 MG tablet Take 2.5 mg by mouth daily.     metoprolol succinate (TOPROL-XL) 25 MG Take 25 mg by mouth daily.     nitroglycerin (NITROSTAT) 0.4 MG SL tablet Place 0.4 mg under the tongue every 5 (five) minutes as needed for chest pain.       Labs:  Results for orders placed or performed in visit on 01/25/19   Basic Metabolic Panel   Result Value Ref Range    Sodium 137 136 - 145 mmol/L    Potassium 4.0 3.5 - 5.0 mmol/L    Chloride 102 98 - 107 mmol/L    CO2 24 22 - 31 mmol/L    Anion Gap, Calculation 11 5 - 18 mmol/L    Glucose 99 70 - 125 mg/dL    Calcium 8.9 8.5 - 10.5 mg/dL    BUN 16 8 - 28 mg/dL    Creatinine 0.69 (L) 0.70 - 1.30 mg/dL    GFR MDRD Af Amer >60 >60 mL/min/1.73m2    GFR MDRD Non Af Amer >60 >60 mL/min/1.73m2     Lab Results   Component Value Date    WBC 6.7 01/25/2019    HGB 10.7 (L) 01/25/2019    HCT 33.5 (L) 01/25/2019     01/25/2019     01/25/2019     No results found for: TSH    No results found for: [id number]    No results found for: [id number]      Assessment/Plan:    Close fracture of left left reversal to his shoulder arthroplasty on 1/16/19 as an elective procedure: Follow-up with Ortho was recommended     Postoperative inferior STEMI: Underwent PTCA, stent placed in proximal circumflex, also found 50% stenosis in the mid LAD, neurology recommended outpatient function testing in 4-6 weeks, continue atorvastatin 40 mg at bedtime    Acute systolic congestive heart failure with ischemic cardiomyopathy: Echocardiogram showed EF 47%    Pain control: continue lidocaine 4%, apply to R shoulder/arm three times a day, family supplying, dc narco, started on tylenol 650mg three times a day    Thrombophlebitis: given doxycyline x5d, resolved    Anticoagulation: Started on Plavix and aspirin    ALBA: Given 2 units of packed red  blood cells, last hemoglobin 10.7    Lymphedema: Has 1+ bilateral lower extremity edema, monitor weights, may need Lasix    Rheumatoid arthritis: Methotrexate has been discontinued as long with his immunosuppressants, recommendation of orthopedics for least 4-6 weeks    Fever of unclear etiology: Resolved    Prediabetes: Last A1c 5.5    Neuropathy and chronic back pain: Continue gabapentin 200 mg 3 times daily    Hypertension: continue lisinopril 2.5mg and metoprolol 25mg daily, BP controlled    Disposition: Plans to return to daughter's home    The care plan has been reviewed and all orders signed. Changes to care plan, if any, as noted. Otherwise, continue care plan of care.  The total time spent with this patient was 45 minutes, with greater than 50% spent in counseling and coordination of care that included multiple issues per chart review.      Electronically signed by: Erik Le NP

## 2021-06-27 ENCOUNTER — RECORDS - HEALTHEAST (OUTPATIENT)
Dept: LAB | Facility: CLINIC | Age: 76
End: 2021-06-27

## 2021-06-27 LAB
SARS-COV-2 PCR COMMENT: NORMAL
SARS-COV-2 RNA SPEC QL NAA+PROBE: NEGATIVE
SARS-COV-2 VIRUS SPECIMEN SOURCE: NORMAL

## 2021-06-28 ENCOUNTER — AMBULATORY - HEALTHEAST (OUTPATIENT)
Dept: GERIATRICS | Facility: CLINIC | Age: 76
End: 2021-06-28

## 2021-06-28 ENCOUNTER — OFFICE VISIT - HEALTHEAST (OUTPATIENT)
Dept: GERIATRICS | Facility: CLINIC | Age: 76
End: 2021-06-28

## 2021-06-28 DIAGNOSIS — Z98.890 H/O CERVICAL SPINE SURGERY: ICD-10-CM

## 2021-06-28 DIAGNOSIS — I10 ESSENTIAL HYPERTENSION: ICD-10-CM

## 2021-06-28 DIAGNOSIS — I89.0 LYMPHEDEMA: ICD-10-CM

## 2021-06-28 DIAGNOSIS — M48.061 SPINAL STENOSIS OF LUMBAR REGION WITHOUT NEUROGENIC CLAUDICATION: ICD-10-CM

## 2021-06-28 NOTE — PROGRESS NOTES
Progress Notes by Erik Le NP at 12/26/2019 10:27 AM     Author: Erik Le NP Service: -- Author Type: Nurse Practitioner    Filed: 12/26/2019 10:55 AM Encounter Date: 12/26/2019 Status: Attested    : Erik Le NP (Nurse Practitioner) Cosigner: Ayala Amezcua MBBS at 12/26/2019  9:39 PM    Attestation signed by Ayala Amezcua MBBS at 12/26/2019  9:39 PM    Agree with discharge plan                [facility]      Facility:    [facility] [[id number]]  Code Status: POLST AVAILABLE      Chief Complaint/Reason for Visit:   No chief complaint on file.      HPI:   Hugh is a 74 y.o. male who is recent transfer from [facility] admission on 12/7/2019 and discharged on 12/11/2019.  He has a past medical history of RA and MI after his shoulder surgery, hypertension who was admitted for a new right femoral neck fracture.  Apparently he lost his balance and fell at high V landing on his right hip.  Imaging showed right femoral neck fracture, underwent hemo-arthroplasty of right hip on 12/8/2019 for closed displaced fracture of right femoral neck, also pathology tissue exam was ordered per fragility fracture.  Apparently he has history of spinal stenosis, underwent head CT, unremarkable.  He was given weightbearing as tolerated, advised to start Forteo or biphosphonate's with an addition of calcium citrate and vitamin D.  He is also on aspirin and Plavix for CAD management, Tylenol and oxycodone for pain control. His last Hgb 10.1.  He was then discharged to TCU for rehab.    He has concluded his TCU stay will be discharged to home with services on 12/27/2019.    Past Medical History:  Past Medical History:   Diagnosis Date   ? CAD (coronary artery disease)    ? Carpal tunnel syndrome, bilateral    ? Closed fracture of distal end of left humerus    ? Closed fracture of neck of right femur (H)    ? Dislocation of left shoulder joint    ?  DM2 (diabetes mellitus, type 2) (H)    ? Elevated C-reactive protein    ? Hiatal hernia    ? Internal derangement of left shoulder    ? Lower extremity edema    ? Prediabetes    ? RA (rheumatoid arthritis) (H)    ? STEMI (ST elevation myocardial infarction) (H)    ? Thrombophlebitis            Surgical History:  Past Surgical History:   Procedure Laterality Date   ? CORONARY STENT PLACEMENT  2019   ? HEMIARTHROPLASTY HIP Right 2019   ? REVERSE TOTAL SHOULDER ARTHROPLASTY Left 2019   ? WISDOM TOOTH EXTRACTION  1966       Family History:   No family history on file.    Social History:    Social History     Socioeconomic History   ? Marital status: Single     Spouse name: Not on file   ? Number of children: Not on file   ? Years of education: Not on file   ? Highest education level: Not on file   Occupational History   ? Not on file   Social Needs   ? Financial resource strain: Not on file   ? Food insecurity:     Worry: Not on file     Inability: Not on file   ? Transportation needs:     Medical: Not on file     Non-medical: Not on file   Tobacco Use   ? Smoking status: Former Smoker     Packs/day: 0.10     Years: 35.00     Pack years: 3.50     Types: Cigarettes     Last attempt to quit: 2019     Years since quittin.9   ? Smokeless tobacco: Never Used   Substance and Sexual Activity   ? Alcohol use: No   ? Drug use: Not on file   ? Sexual activity: Not on file   Lifestyle   ? Physical activity:     Days per week: Not on file     Minutes per session: Not on file   ? Stress: Not on file   Relationships   ? Social connections:     Talks on phone: Not on file     Gets together: Not on file     Attends Oriental orthodox service: Not on file     Active member of club or organization: Not on file     Attends meetings of clubs or organizations: Not on file     Relationship status: Not on file   ? Intimate partner violence:     Fear of current or ex partner: Not on file     Emotionally abused: Not on file      Physically abused: Not on file     Forced sexual activity: Not on file   Other Topics Concern   ? Not on file   Social History Narrative   ? Not on file          Review of Systems   Constitutional: Positive for activity change. Negative for appetite change, chills, diaphoresis, fatigue and fever.        No concerns   HENT: Negative for congestion and hearing loss.    Eyes: Negative.    Respiratory: Negative for shortness of breath and wheezing.    Cardiovascular: Negative.    Gastrointestinal: Negative for abdominal distention, abdominal pain, constipation, diarrhea and nausea.   Endocrine: Negative.    Genitourinary: Negative for difficulty urinating.   Musculoskeletal:        R LE pain improved   Skin: Positive for wound.        R hip   Allergic/Immunologic: Negative.    Neurological: Negative for dizziness, tremors and light-headedness.   Hematological: Negative.    Psychiatric/Behavioral: Positive for sleep disturbance. Negative for agitation, confusion and hallucinations.       Vitals:    12/26/19 1028   BP: 105/67   Pulse: 68   Resp: 17   Temp: 98  F (36.7  C)   SpO2: 94%   Weight: 179 lb (81.2 kg)       Physical Exam  Vitals signs reviewed.   Constitutional:       Comments: No acute issues   HENT:      Head: Normocephalic and atraumatic.      Right Ear: External ear normal.      Left Ear: External ear normal.      Nose: No congestion or rhinorrhea.      Mouth/Throat:      Mouth: Mucous membranes are moist.      Pharynx: No oropharyngeal exudate or posterior oropharyngeal erythema.   Eyes:      General:         Right eye: No discharge.         Left eye: No discharge.   Neck:      Musculoskeletal: Normal range of motion and neck supple.      Comments: Intact  Cardiovascular:      Rate and Rhythm: Normal rate.      Heart sounds: No murmur. No friction rub. No gallop.    Pulmonary:      Effort: No respiratory distress.      Breath sounds: Normal breath sounds. No wheezing or rales.      Comments: CTA, room  air  Abdominal:      General: Bowel sounds are normal. There is no distension.      Palpations: Abdomen is soft.      Tenderness: There is no abdominal tenderness. There is no guarding.      Comments: Constipation improved   Genitourinary:     Comments: No limitations  Musculoskeletal:         General: No swelling or deformity.      Right lower leg: No edema.      Left lower leg: No edema.      Comments: R hip pain more controlled, using walker   Skin:     General: Skin is warm and dry.      Comments: Intact R hip incision   Neurological:      Mental Status: He is alert and oriented to person, place, and time.   Psychiatric:         Mood and Affect: Mood normal.      Comments: Denies anxiety or depression         Medication List:  Current Outpatient Medications   Medication Sig   ? acetaminophen (TYLENOL) 500 MG tablet Take 500 mg by mouth every 4 (four) hours as needed for pain. Max 4000mg/day   ? acetaminophen (TYLENOL) 325 MG tablet Take 650 mg by mouth 4 (four) times a day.    ? aspirin 81 MG EC tablet Take 81 mg by mouth daily.   ? atorvastatin (LIPITOR) 40 MG tablet Take 40 mg by mouth at bedtime.   ? CALCIUM CITRATE ORAL Take 600 mg by mouth 2 (two) times a day.   ? cholecalciferol, vitamin D3, 5,000 unit Tab Take 1 tablet by mouth daily.   ? clopidogrel (PLAVIX) 75 mg tablet Take 75 mg by mouth daily.   ? hydrOXYzine pamoate (VISTARIL) 25 MG capsule Take 25 mg by mouth 4 (four) times a day. Give with tylenol   ? melatonin 3 mg Tab tablet Take 6 mg by mouth at bedtime.   ? metoprolol succinate (TOPROL-XL) 25 MG Take 12.5 mg by mouth daily.          ? nitroglycerin (NITROSTAT) 0.4 MG SL tablet Place 0.4 mg under the tongue every 5 (five) minutes as needed for chest pain.   ? oxyCODONE (ROXICODONE) 5 MG immediate release tablet Take 2.5 mg by mouth every 6 (six) hours as needed for pain.    ? polyethylene glycol (MIRALAX) 17 gram packet Take 17 g by mouth daily.       Labs:  Results for orders placed or  performed in visit on 12/18/19   Basic Metabolic Panel   Result Value Ref Range    Sodium 137 136 - 145 mmol/L    Potassium 4.1 3.5 - 5.0 mmol/L    Chloride 101 98 - 107 mmol/L    CO2 29 22 - 31 mmol/L    Anion Gap, Calculation 7 5 - 18 mmol/L    Glucose 96 70 - 125 mg/dL    Calcium 9.5 8.5 - 10.5 mg/dL    BUN 16 8 - 28 mg/dL    Creatinine 0.73 0.70 - 1.30 mg/dL    GFR MDRD Af Amer >60 >60 mL/min/1.73m2    GFR MDRD Non Af Amer >60 >60 mL/min/1.73m2     Lab Results   Component Value Date    WBC 6.2 12/18/2019    HGB 10.7 (L) 12/18/2019    HCT 34.0 (L) 12/18/2019    MCV 99 12/18/2019     12/18/2019     Vitamin D, Total (25-Hydroxy)   Date Value Ref Range Status   12/16/2019 22.0 (L) 30.0 - 80.0 ng/mL Final     Lab Results   Component Value Date    TSH 2.28 12/16/2019     A1c 5.5      Assessment/Plan:    Fragility fracture resultant right femoral neck fracture, underwent hemo-arthroplasty of right hip on 12/8/2019: WBAT, surgeon suggested calcium citrate 600 mg twice daily, D3 5000 units daily and treatment for osteoporosis to be addressed by PCP (biphosphonate or Forteo)    ABLA: last Hgb 10.7 on 12/18    Pain control: continue Tylenol 650 4 times daily with Vistaril 4 times daily.  Continue oxycodone 2.5 mg every 6 hours as needed, controlled    DVT prophylaxis with history of CAD: Continue aspirin 81 mg, Plavix daily and atorvastatin 40mg daily    HTN: continue metoprolol succinate 12.5mg daily, SBP <130    Insomnia: continue melatonin 6 mg at bedtime, some improvment    Constipation: Started MiraLAX daily, effective    Disposition: Plans to return to home with services on 12/27. [facility] 28/30      MEDICAL EQUIPMENT NEEDS:  na    DISCHARGE PLAN/FACE TO FACE:  I certify that services are/were furnished while this patient was under the care of a physician and that a physician or an allowed non-physician practitioner (NPP), had a face-to-face encounter that meets the physician face-to-face encounter requirements.  The encounter was in whole, or in part, related to the primary reason for home health. The patient is confined to his/her home and needs intermittent skilled nursing, physical therapy, speech-language pathology, or the continued need for occupational therapy. A plan of care has been established by a physician and is periodically reviewed by a physician.  Date of Face-to-Face Encounter: 12/26/19    I certify that, based on my findings, the following services are medically necessary home health services: [facility] HHA/RN and PT/OT to evaluate and treat.    My clinical findings support the need for the above skilled services because: patient will be discharging to home.  Patient need assistance with medication management and performing IADLs and ADLs effectively and safely at home.    Patient to re-establish plan of care with their PCP within 7 days after leaving TCU.     The care plan has been reviewed and all orders signed. Changes to care plan, if any, as noted. Otherwise, continue care plan of care.  The total time spent with this patient was 31 minutes, with greater than 50% spent in counseling and coordination of care that included multiple issues per pain control, continuing therapy and discharge, which lasted 16 minutes.      Electronically signed by: Erik Le NP

## 2021-06-29 RX ORDER — GABAPENTIN 300 MG/1
300 CAPSULE ORAL 3 TIMES DAILY
Status: SHIPPED | COMMUNITY
Start: 2021-06-26

## 2021-06-29 RX ORDER — AMOXICILLIN 250 MG
2 CAPSULE ORAL 2 TIMES DAILY
Status: SHIPPED | COMMUNITY
Start: 2021-06-26

## 2021-06-29 RX ORDER — CARBIDOPA AND LEVODOPA 25; 100 MG/1; MG/1
1 TABLET ORAL 3 TIMES DAILY
Status: SHIPPED | COMMUNITY
Start: 2021-06-26

## 2021-06-29 RX ORDER — HYDROCODONE BITARTRATE AND ACETAMINOPHEN 5; 325 MG/1; MG/1
1 TABLET ORAL EVERY 4 HOURS PRN
Status: SHIPPED | COMMUNITY
Start: 2021-06-26

## 2021-06-29 RX ORDER — METHOCARBAMOL 500 MG/1
500 TABLET, FILM COATED ORAL EVERY 6 HOURS PRN
Status: SHIPPED | COMMUNITY
Start: 2021-06-26

## 2021-07-05 PROBLEM — S42.202A CLOSED FRACTURE OF LEFT PROXIMAL HUMERUS: Status: ACTIVE | Noted: 2021-06-29

## 2021-07-05 PROBLEM — Z71.89 ACP (ADVANCE CARE PLANNING): Status: ACTIVE | Noted: 2021-06-24

## 2021-07-05 PROBLEM — G62.9 PERIPHERAL NEUROPATHY: Status: ACTIVE | Noted: 2021-06-26

## 2021-07-05 PROBLEM — I50.22 CHRONIC SYSTOLIC HEART FAILURE (H): Status: ACTIVE | Noted: 2021-06-24

## 2021-07-05 PROBLEM — R60.0 BILATERAL LEG EDEMA: Status: ACTIVE | Noted: 2021-06-24

## 2021-07-05 PROBLEM — Z47.89 AFTERCARE FOLLOWING SURGERY OF THE MUSCULOSKELETAL SYSTEM: Status: ACTIVE | Noted: 2019-01-29

## 2021-07-05 PROBLEM — S42.402D CLOSED FRACTURE OF DISTAL END OF LEFT HUMERUS WITH ROUTINE HEALING: Status: ACTIVE | Noted: 2019-01-08

## 2021-07-05 PROBLEM — D62 ACUTE BLOOD LOSS AS CAUSE OF POSTOPERATIVE ANEMIA: Status: ACTIVE | Noted: 2019-01-18

## 2021-07-05 PROBLEM — M48.02 CERVICAL STENOSIS OF SPINE: Status: ACTIVE | Noted: 2021-06-24

## 2021-07-05 PROBLEM — D64.9 ACUTE ANEMIA: Status: ACTIVE | Noted: 2021-06-24

## 2021-07-05 PROBLEM — E11.9 TYPE 2 DIABETES MELLITUS WITHOUT COMPLICATION, WITHOUT LONG-TERM CURRENT USE OF INSULIN (H): Status: ACTIVE | Noted: 2021-04-01

## 2021-07-05 PROBLEM — Z96.612 S/P REVERSE TOTAL SHOULDER ARTHROPLASTY, LEFT: Status: ACTIVE | Noted: 2019-01-16

## 2021-07-05 PROBLEM — G20.A1 PARKINSON DISEASE (H): Status: ACTIVE | Noted: 2021-06-29

## 2021-07-05 PROBLEM — S72.001A CLOSED FRACTURE OF NECK OF RIGHT FEMUR (H): Status: ACTIVE | Noted: 2019-12-07

## 2021-07-05 PROBLEM — I25.118 CORONARY ARTERY DISEASE OF NATIVE ARTERY OF NATIVE HEART WITH STABLE ANGINA PECTORIS (H): Status: ACTIVE | Noted: 2021-06-24

## 2021-07-05 PROBLEM — R73.03 PREDIABETES: Status: ACTIVE | Noted: 2019-01-09

## 2021-07-05 PROBLEM — M48.061 LUMBAR STENOSIS: Status: ACTIVE | Noted: 2021-06-24
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
## 2021-07-05 PROBLEM — S43.005A DISLOCATION OF LEFT SHOULDER JOINT: Status: ACTIVE | Noted: 2019-02-05

## 2021-07-05 PROBLEM — I21.19 ST ELEVATION MYOCARDIAL INFARCTION (STEMI) OF INFERIOR WALL (H): Status: ACTIVE | Noted: 2021-06-29

## 2021-07-05 PROBLEM — E86.1 HYPOTENSION DUE TO HYPOVOLEMIA: Status: ACTIVE | Noted: 2019-01-18

## 2021-07-05 PROBLEM — M24.812 INTERNAL DERANGEMENT OF LEFT SHOULDER: Status: ACTIVE | Noted: 2021-06-29

## 2021-07-06 ENCOUNTER — RECORDS - HEALTHEAST (OUTPATIENT)
Dept: LAB | Facility: CLINIC | Age: 76
End: 2021-07-06

## 2021-07-07 ENCOUNTER — COMMUNICATION - HEALTHEAST (OUTPATIENT)
Dept: GERIATRICS | Facility: CLINIC | Age: 76
End: 2021-07-07

## 2021-07-07 LAB — HGB BLD-MCNC: 10.8 G/DL (ref 14–18)

## 2021-07-07 NOTE — LETTER
Letter by Ayala Amezcua MBBS at      Author: Ayala Amezcua MBBS Service: -- Author Type: --    Filed:  Encounter Date: 6/28/2021 Status: (Other)         Patient: Hugh Berman Jr.   MR Number: [id number]   YOB: 1945   Date of Visit: 6/28/2021       [facility] note      Patient: Hugh Berman Jr.  MRN: [id number]      [facility] [[id number]]  Reason for Visit     Chief Complaint   Patient presents with   ? H & P       Code Status     Full code    Assessment     History of a mechanical fall  Status post ACDF C4-C7 with decompression done on 6/24/2021  Lumbar spinal stenosis  Underlying history of Parkinson's  Congestive heart failure  Anemia  Lower extremity edema  Generalized weakness  Recent history of 50 pound weight loss    Plan     Patient admitted to the TCU for strengthening and rehab.  He underwent cervical spine surgery on 6/24/2021  Currently has a Kerr J collar.  Pain concerns are minimal.  He will do an outpatient follow-up with his surgeon.  Scheduled for further decompression surgery of his lumbar spine 3 weeks postoperatively.  He does not want to use narcotics for pain management and plans to use primarily Tylenol.  He has underlying history of Parkinson's disease with peripheral neuropathy which leads to profound gait instability and he has fallen.  Currently he is on Sinemet which he feels is working well for him.  Apparently changes made in the hospital from 2 tabs twice daily to 1 tab 3 times daily.  His additional work-up was done by his neurologist which was within normal limits.  He will need an outpatient follow-up for possible nerve conduction studies and EMG.  He has CHF and was felt to be well compensated.  He also has bilateral leg edema which is felt to be related to neuropathy and impaired mobility and not to heart failure he continues to be stable.  Blood pressures are borderline low but he denies any history of orthostatic  hypotension.  Continue with his PT OT and outpatient follow-up with his surgeon.  Monitor blood pressures and weights closely  He does report that he has lost 50 pounds but this was mostly unintentional but is hoping to currently sustain his current weight appetite appears to be good      History     Patient is a very pleasant 76 y.o. male who is admitted to TCU  Patient presented to the hospital after a fall.  This was felt to be multifactorial related to his weakness and underlying history of gait instability related to Parkinson's.  He also has peripheral neuropathy.  Imaging was negative for any acute injury of the head face and cervical spine.  However he was known to have bilateral lower extremity weakness with numbness.  He also has bladder and bowel incontinence and underwent cervical spine decompression surgery with fusion of C4-C7 on 6/24/2021 postoperatively has been given a Lares J collar.  He has underlying history of lumbar spinal stenosis the plan is that in 3 weeks he will have another round of surgery on his lumbar spine.  He has underlying history of Parkinson's which has left him with profound gait instability but no tremors.      Past Medical History     Active Ambulatory (Non-Hospital) Problems    Diagnosis   ? Fracture of neck of right femur (H)   ? Essential hypertension   ? Adjustment insomnia   ? Closed fracture of proximal end of left humerus with routine healing   ? Acute ST elevation myocardial infarction (STEMI) of inferior wall (H)   ? Anticoagulation adequate   ? Lymphedema   ? Anemia   ? Acute systolic heart failure (H)   ? Pain   ? Rheumatoid arthritis involving multiple sites (H)     Past Medical History:   Diagnosis Date   ? CAD (coronary artery disease)    ? Carpal tunnel syndrome, bilateral    ? Closed fracture of distal end of left humerus    ? Closed fracture of neck of right femur (H)    ? Dislocation of left shoulder joint    ? DM2 (diabetes mellitus, type 2) (H)    ?  Elevated C-reactive protein    ? Hiatal hernia    ? Internal derangement of left shoulder    ? Lower extremity edema    ? Prediabetes    ? RA (rheumatoid arthritis) (H)    ? STEMI (ST elevation myocardial infarction) (H)    ? Thrombophlebitis        Past Social History     Reviewed, and he  reports that he quit smoking about 2 years ago. His smoking use included cigarettes. He has a 3.50 pack-year smoking history. He has never used smokeless tobacco. He reports that he does not drink alcohol.    Family History     Reviewed, and includes a history of heart disease as well as stroke and hypertension in his family members including his parents    Medication List   Post Discharge Medication Reconciliation Status: discharge medications reconciled, continue medications without change   Current Outpatient Medications on File Prior to Visit   Medication Sig Dispense Refill   ? acetaminophen (TYLENOL) 325 MG tablet Take 650 mg by mouth 4 (four) times a day.      ? acetaminophen (TYLENOL) 500 MG tablet Take 500 mg by mouth every 4 (four) hours as needed for pain. Max 4000mg/day     ? aspirin 81 MG EC tablet Take 81 mg by mouth daily.     ? atorvastatin (LIPITOR) 40 MG tablet Take 40 mg by mouth at bedtime.     ? CALCIUM CITRATE ORAL Take 600 mg by mouth 2 (two) times a day.     ? cholecalciferol, vitamin D3, 5,000 unit Tab Take 1 tablet by mouth daily.     ? clopidogrel (PLAVIX) 75 mg tablet Take 75 mg by mouth daily.     ? hydrOXYzine pamoate (VISTARIL) 25 MG capsule Take 25 mg by mouth 4 (four) times a day. Give with tylenol     ? melatonin 3 mg Tab tablet Take 6 mg by mouth at bedtime.     ? metoprolol succinate (TOPROL-XL) 25 MG Take 12.5 mg by mouth daily.            ? nitroglycerin (NITROSTAT) 0.4 MG SL tablet Place 0.4 mg under the tongue every 5 (five) minutes as needed for chest pain.     ? oxyCODONE (ROXICODONE) 5 MG immediate release tablet Take 2.5 mg by mouth every 6 (six) hours as needed for pain.      ?  polyethylene glycol (MIRALAX) 17 gram packet Take 17 g by mouth daily.       No current facility-administered medications on file prior to visit.        Allergies     Allergies   Allergen Reactions   ? Penicillins Hives   ? Sulfa (Sulfonamide Antibiotics) Hives       Review of Systems   A comprehensive review of 14 systems was done. Pertinent findings noted here and in history of present illness. All the rest negative.  Constitutional: Negative.  Negative for fever, chills, he has activity change, appetite change and fatigue.   HENT: Negative for congestion and facial swelling.    Eyes: Negative for photophobia, redness and visual disturbance.   Respiratory: Negative for cough and chest tightness.    Cardiovascular: Negative for chest pain, palpitations and has chronic leg swelling.   Gastrointestinal: Negative for nausea, diarrhea, constipation, blood in stool and abdominal distention.   Genitourinary: Negative.    Musculoskeletal: He denies any tremors or Parkinson's but does report that he has fallen  Skin: Negative.    Neurological: Negative for dizziness, tremors, syncope, weakness, light-headedness and headaches.   Hematological: Does not bruise/bleed easily.   Psychiatric/Behavioral: Negative.        Physical Exam     Recent Vitals 12/26/2019   Weight 179 lbs   /67   Pulse 68   Temp 98   SpO2 94   Some recent data might be hidden       Constitutional: Oriented to person, place, and time and appears well-developed.   HEENT:  Normocephalic and atraumatic.  Eyes: Conjunctivae and EOM are normal. Pupils are equal, round, and reactive to light. No discharge.  No scleral icterus. Nose normal. Mouth/Throat: Oropharynx is clear and moist. No oropharyngeal exudate.    NECK: Normal range of motion. Neck supple. No JVD present. No tracheal deviation present. No thyromegaly present.   Coffeen J collar present  CARDIOVASCULAR: Normal rate, regular rhythm and intact distal pulses.  Exam reveals no gallop and no  friction rub.  Systolic murmur present.  PULMONARY: Effort normal and breath sounds normal. No respiratory distress.No Wheezing or rales.  ABDOMEN: Soft. Bowel sounds are normal. No distension and no mass.  There is no tenderness. There is no rebound and no guarding. No HSM.  MUSCULOSKELETAL: Normal range of motion. No edema and no tenderness. Mild kyphosis, no tenderness.  Abduction of the left shoulder limited to about 40 degrees  LYMPH NODES: Has no cervical, supraclavicular, axillary and groin adenopathy.   NEUROLOGICAL: Alert and oriented to person, place, and time. No cranial nerve deficit.  Normal muscle tone. Coordination normal.   GENITOURINARY: Deferred exam.  SKIN: Skin is warm and dry. No rash noted. No erythema. No pallor.   EXTREMITIES: No cyanosis, no clubbing, no edema. No Deformity.  PSYCHIATRIC: Normal mood, affect and behavior.      Lab Results     Recent Results (from the past 240 hour(s))   COVID-19 Virus PCR MRF    Collection Time: 06/26/21  1:30 PM    Specimen: Respiratory   Result Value Ref Range    COVID-19 VIRUS SPECIMEN SOURCE Nasopharyngeal     2019-nCOV       Test received-See reflex to IDDL test SARS CoV2 (COVID-19) Virus RT-PCR   SARS-CoV-2 (COVID-19)-PCR    Collection Time: 06/26/21  1:30 PM    Specimen: Respiratory   Result Value Ref Range    SARS-CoV-2 Virus Specimen Source Nasopharyngeal     SARS-CoV-2 PCR Result NEGATIVE     SARS-COV-2 PCR COMMENT       Testing was performed using the Xpert Xpress SARS-CoV-2 Assay on the [employer]                Electronically signed by  NIKHIL Luque

## 2021-07-07 NOTE — TELEPHONE ENCOUNTER
Telephone Encounter by Diogo Olivo RN at 7/7/2021  1:41 PM     Author: Diogo Olivo RN Service: -- Author Type: Registered Nurse    Filed: 7/7/2021  1:42 PM Encounter Date: 7/7/2021 Status: Signed    : Diogo Olivo RN (Registered Nurse)       [facility] Nurse Triage Telephone Note      Provider: Ayala Amezcua MD  Facility: [facility] [place] Type: TCU    Caller: Jinny  Call Back Number:  [phone]    Allergies: Penicillins and Sulfa (sulfonamide antibiotics)    Reason for call: Nurse calling to report Hgb results.  Notable meds:  EC ASA 81mg daily.       Verbal Order/Direction given by Provider: No new orders.      Provider giving order: Ayala Amezcua MD    Verbal order given to: Jinny Olivo RN

## 2021-07-07 NOTE — PROGRESS NOTES
[facility] Care note      Patient: Hugh Berman Jr.  MRN: [id number]      [facility] [[id number]]  Reason for Visit     Chief Complaint   Patient presents with     H & P       Code Status     Full code    Assessment     History of a mechanical fall  Status post ACDF C4-C7 with decompression done on 6/24/2021  Lumbar spinal stenosis  Underlying history of Parkinson's  Congestive heart failure  Anemia  Lower extremity edema  Generalized weakness  Recent history of 50 pound weight loss    Plan     Patient admitted to the TCU for strengthening and rehab.  He underwent cervical spine surgery on 6/24/2021  Currently has a [place] J collar.  Pain concerns are minimal.  He will do an outpatient follow-up with his surgeon.  Scheduled for further decompression surgery of his lumbar spine 3 weeks postoperatively.  He does not want to use narcotics for pain management and plans to use primarily Tylenol.  He has underlying history of Parkinson's disease with peripheral neuropathy which leads to profound gait instability and he has fallen.  Currently he is on Sinemet which he feels is working well for him.  Apparently changes made in the hospital from 2 tabs twice daily to 1 tab 3 times daily.  His additional work-up was done by his neurologist which was within normal limits.  He will need an outpatient follow-up for possible nerve conduction studies and EMG.  He has CHF and was felt to be well compensated.  He also has bilateral leg edema which is felt to be related to neuropathy and impaired mobility and not to heart failure he continues to be stable.  Blood pressures are borderline low but he denies any history of orthostatic hypotension.  Continue with his PT OT and outpatient follow-up with his surgeon.  Monitor blood pressures and weights closely  He does report that he has lost 50 pounds but this was mostly unintentional but is hoping to currently sustain his current weight appetite appears  to be good      History     Patient is a very pleasant 76 y.o. male who is admitted to TCU  Patient presented to the hospital after a fall.  This was felt to be multifactorial related to his weakness and underlying history of gait instability related to Parkinson's.  He also has peripheral neuropathy.  Imaging was negative for any acute injury of the head face and cervical spine.  However he was known to have bilateral lower extremity weakness with numbness.  He also has bladder and bowel incontinence and underwent cervical spine decompression surgery with fusion of C4-C7 on 6/24/2021 postoperatively has been given a [place] J collar.  He has underlying history of lumbar spinal stenosis the plan is that in 3 weeks he will have another round of surgery on his lumbar spine.  He has underlying history of Parkinson's which has left him with profound gait instability but no tremors.      Past Medical History     Active Ambulatory (Non-Hospital) Problems    Diagnosis     Fracture of neck of right femur (H)     Essential hypertension     Adjustment insomnia     Closed fracture of proximal end of left humerus with routine healing     Acute ST elevation myocardial infarction (STEMI) of inferior wall (H)     Anticoagulation adequate     Lymphedema     Anemia     Acute systolic heart failure (H)     Pain     Rheumatoid arthritis involving multiple sites (H)     Past Medical History:   Diagnosis Date     CAD (coronary artery disease)      Carpal tunnel syndrome, bilateral      Closed fracture of distal end of left humerus      Closed fracture of neck of right femur (H)      Dislocation of left shoulder joint      DM2 (diabetes mellitus, type 2) (H)      Elevated C-reactive protein      Hiatal hernia      Internal derangement of left shoulder      Lower extremity edema      Prediabetes      RA (rheumatoid arthritis) (H)      STEMI (ST elevation myocardial infarction) (H)      Thrombophlebitis        Past Social History      Reviewed, and he  reports that he quit smoking about 2 years ago. His smoking use included cigarettes. He has a 3.50 pack-year smoking history. He has never used smokeless tobacco. He reports that he does not drink alcohol.    Family History     Reviewed, and includes a history of heart disease as well as stroke and hypertension in his family members including his parents    Medication List   Post Discharge Medication Reconciliation Status: discharge medications reconciled, continue medications without change   Current Outpatient Medications on File Prior to Visit   Medication Sig Dispense Refill     acetaminophen (TYLENOL) 325 MG tablet Take 650 mg by mouth 4 (four) times a day.        acetaminophen (TYLENOL) 500 MG tablet Take 500 mg by mouth every 4 (four) hours as needed for pain. Max 4000mg/day       aspirin 81 MG EC tablet Take 81 mg by mouth daily.       atorvastatin (LIPITOR) 40 MG tablet Take 40 mg by mouth at bedtime.       CALCIUM CITRATE ORAL Take 600 mg by mouth 2 (two) times a day.       cholecalciferol, vitamin D3, 5,000 unit Tab Take 1 tablet by mouth daily.       clopidogrel (PLAVIX) 75 mg tablet Take 75 mg by mouth daily.       hydrOXYzine pamoate (VISTARIL) 25 MG capsule Take 25 mg by mouth 4 (four) times a day. Give with tylenol       melatonin 3 mg Tab tablet Take 6 mg by mouth at bedtime.       metoprolol succinate (TOPROL-XL) 25 MG Take 12.5 mg by mouth daily.              nitroglycerin (NITROSTAT) 0.4 MG SL tablet Place 0.4 mg under the tongue every 5 (five) minutes as needed for chest pain.       oxyCODONE (ROXICODONE) 5 MG immediate release tablet Take 2.5 mg by mouth every 6 (six) hours as needed for pain.        polyethylene glycol (MIRALAX) 17 gram packet Take 17 g by mouth daily.       No current facility-administered medications on file prior to visit.        Allergies     Allergies   Allergen Reactions     Penicillins Hives     Sulfa (Sulfonamide Antibiotics) Hives       Review of  Systems   A comprehensive review of 14 systems was done. Pertinent findings noted here and in history of present illness. All the rest negative.  Constitutional: Negative.  Negative for fever, chills, he has activity change, appetite change and fatigue.   HENT: Negative for congestion and facial swelling.    Eyes: Negative for photophobia, redness and visual disturbance.   Respiratory: Negative for cough and chest tightness.    Cardiovascular: Negative for chest pain, palpitations and has chronic leg swelling.   Gastrointestinal: Negative for nausea, diarrhea, constipation, blood in stool and abdominal distention.   Genitourinary: Negative.    Musculoskeletal: He denies any tremors or Parkinson's but does report that he has fallen  Skin: Negative.    Neurological: Negative for dizziness, tremors, syncope, weakness, light-headedness and headaches.   Hematological: Does not bruise/bleed easily.   Psychiatric/Behavioral: Negative.        Physical Exam     Recent Vitals 12/26/2019   Weight 179 lbs   /67   Pulse 68   Temp 98   SpO2 94   Some recent data might be hidden       Constitutional: Oriented to person, place, and time and appears well-developed.   HEENT:  Normocephalic and atraumatic.  Eyes: Conjunctivae and EOM are normal. Pupils are equal, round, and reactive to light. No discharge.  No scleral icterus. Nose normal. Mouth/Throat: Oropharynx is clear and moist. No oropharyngeal exudate.    NECK: Normal range of motion. Neck supple. No JVD present. No tracheal deviation present. No thyromegaly present.   [place] J collar present  CARDIOVASCULAR: Normal rate, regular rhythm and intact distal pulses.  Exam reveals no gallop and no friction rub.  Systolic murmur present.  PULMONARY: Effort normal and breath sounds normal. No respiratory distress.No Wheezing or rales.  ABDOMEN: Soft. Bowel sounds are normal. No distension and no mass.  There is no tenderness. There is no rebound and no guarding. No  HSM.  MUSCULOSKELETAL: Normal range of motion. No edema and no tenderness. Mild kyphosis, no tenderness.  Abduction of the left shoulder limited to about 40 degrees  LYMPH NODES: Has no cervical, supraclavicular, axillary and groin adenopathy.   NEUROLOGICAL: Alert and oriented to person, place, and time. No cranial nerve deficit.  Normal muscle tone. Coordination normal.   GENITOURINARY: Deferred exam.  SKIN: Skin is warm and dry. No rash noted. No erythema. No pallor.   EXTREMITIES: No cyanosis, no clubbing, no edema. No Deformity.  PSYCHIATRIC: Normal mood, affect and behavior.      Lab Results     Recent Results (from the past 240 hour(s))   COVID-19 Virus PCR MRF    Collection Time: 06/26/21  1:30 PM    Specimen: Respiratory   Result Value Ref Range    COVID-19 VIRUS SPECIMEN SOURCE Nasopharyngeal     2019-nCOV       Test received-See reflex to IDDL test SARS CoV2 (COVID-19) Virus RT-PCR   SARS-CoV-2 (COVID-19)-PCR    Collection Time: 06/26/21  1:30 PM    Specimen: Respiratory   Result Value Ref Range    SARS-CoV-2 Virus Specimen Source Nasopharyngeal     SARS-CoV-2 PCR Result NEGATIVE     SARS-COV-2 PCR COMMENT       Testing was performed using the Xpert Xpress SARS-CoV-2 Assay on the [employer]                Electronically signed by  NIKHIL Luque

## 2021-07-16 ENCOUNTER — TRANSITIONAL CARE UNIT VISIT (OUTPATIENT)
Dept: GERIATRICS | Facility: CLINIC | Age: 76
End: 2021-07-16
Payer: MEDICARE

## 2021-07-16 VITALS
SYSTOLIC BLOOD PRESSURE: 111 MMHG | TEMPERATURE: 97.7 F | OXYGEN SATURATION: 99 % | WEIGHT: 150.4 LBS | HEART RATE: 62 BPM | HEIGHT: 67 IN | DIASTOLIC BLOOD PRESSURE: 69 MMHG | BODY MASS INDEX: 23.61 KG/M2 | RESPIRATION RATE: 17 BRPM

## 2021-07-16 DIAGNOSIS — M48.02 SPINAL STENOSIS IN CERVICAL REGION: ICD-10-CM

## 2021-07-16 DIAGNOSIS — Z98.890 H/O CERVICAL SPINE SURGERY: Primary | ICD-10-CM

## 2021-07-16 DIAGNOSIS — E11.9 TYPE 2 DIABETES MELLITUS WITHOUT COMPLICATION, WITHOUT LONG-TERM CURRENT USE OF INSULIN (H): ICD-10-CM

## 2021-07-16 DIAGNOSIS — I25.118 CORONARY ARTERY DISEASE OF NATIVE ARTERY OF NATIVE HEART WITH STABLE ANGINA PECTORIS (H): ICD-10-CM

## 2021-07-16 DIAGNOSIS — I10 ESSENTIAL HYPERTENSION: ICD-10-CM

## 2021-07-16 DIAGNOSIS — G20.A1 PARKINSON DISEASE (H): ICD-10-CM

## 2021-07-16 DIAGNOSIS — I25.2 HISTORY OF ST ELEVATION MYOCARDIAL INFARCTION (STEMI): ICD-10-CM

## 2021-07-16 PROCEDURE — 99309 SBSQ NF CARE MODERATE MDM 30: CPT | Performed by: NURSE PRACTITIONER

## 2021-07-16 ASSESSMENT — MIFFLIN-ST. JEOR: SCORE: 1380.84

## 2021-07-16 NOTE — PROGRESS NOTES
[facility]  [address] [place] [address]  Phone: [phone]  Fax: [phone]  Primary Provider: Ar Alanis    6}  PREOPERATIVE EVALUATION:  Today's date: 7/16/2021    Hugh Berman Jr. is a 76 year old male who presents for a preoperative evaluation.He has past medical history for STEMI, CAD, heart failure, type 2 diabetes, hypertension, Parkinson's disease.  Was recently hospitalized 6/19-6/26 after a fall determined to be mechanical.  He was found to have a C4-7 ACDF and underwent decompression on 6/24 and placed in a [place] J collar.  He does have cervical stenosis and lumbar stenosis leading to chronic pain. He is being evaluated for risk stratification for another procedure involving surgery for C5-C7 fusion.      Surgical Information:  Surgery/Procedure: C5-C7 fusion  Surgery Location: [facility]  Surgeon: Dr. Lang  Surgery Date: 7/22/2021    Where patient plans to recover: At home with family      Type of Anesthesia Anticipated: General    Assessment & Plan     The proposed surgical procedure is considered {HIGH=major cardiovascular or procedures requiring prolonged anesthesia >4 hours or large fluid shifts;     Possible Sleep Apnea:        Risks and Recommendations:  The patient has the following additional risks and recommendations for perioperative complications:  Cardiovascular:   - history of STEMI, EKG ordered    Medication Instructions:   - aspirin: Discontinue aspirin 7-10 days prior to procedure to reduce bleeding risk. It should be resumed postoperatively. hold starting 7/12    RECOMMENDATION:  APPROVAL GIVEN to proceed with proposed procedure pending review of diagnostic evaluation.  [id number]}    Subjective         Health Care Directive:  Patient does not have a Health Care Directive or Living Will: Patient states has Advance Directive and will bring in a copy to clinic.  0756}    Status of Chronic Conditions:  ANEMIA - Patient has a recent  history of moderate-severe anemia, which has not been symptomatic. Work up to date has revealed last hgb 10.8, likely chronic disease.     Diabetes: stable on no medications    HTN: stable without medications    Hx of STEMI: angina stable, has prn nitroglycerin, on asa and lipitor    Parkinsons: improving with recent increase in Sinemet    No history of bleeding or clots.  Currently no fever, chills, chest pain, viral symptoms    Review of Systems  Patient denies fever, chills, headache, lightheadedness, dizziness, rhinorrhea, cough, congestion, shortness of breath, chest pain, palpitations, abdominal pain, n/v, diarrhea, constipation, change in appetite, change in sleep pattern, dysuria, frequency, burning or pain with urination.  Other than stated in HPI all other review of systems is negative.         Patient Active Problem List    Diagnosis Date Noted     Internal derangement of left shoulder 06/29/2021     Priority: Medium     Parkinson disease (H) 06/29/2021     Priority: Medium     Formatting of this note might be different from the original.  sees Neurology with [facility]         ST elevation myocardial infarction (STEMI) of inferior wall (H) 06/29/2021     Priority: Medium     Closed fracture of left proximal humerus 06/29/2021     Priority: Medium     Peripheral neuropathy 06/26/2021     Priority: Medium     ACP (advance care planning) 06/24/2021     Priority: Medium     Formatting of this note might be different from the original.  Full code         Bilateral leg edema 06/24/2021     Priority: Medium     Cervical stenosis of spine 06/24/2021     Priority: Medium     Lumbar stenosis 06/24/2021     Priority: Medium     Coronary artery disease of native artery of native heart with stable angina pectoris (H) 06/24/2021     Priority: Medium     Fall 06/24/2021     Priority: Medium     Chronic systolic heart failure (H) 06/24/2021     Priority: Medium     Acute anemia 06/24/2021     Priority: Medium     Type  2 diabetes mellitus without complication, without long-term current use of insulin (H) 04/01/2021     Priority: Medium     Fracture of neck of right femur (H) 12/14/2019     Priority: Medium     Essential hypertension 12/14/2019     Priority: Medium     Adjustment insomnia 12/14/2019     Priority: Medium     Closed fracture of neck of right femur (H) 12/07/2019     Priority: Medium     Dislocation of left shoulder joint 02/05/2019     Priority: Medium     Aftercare following surgery of the musculoskeletal system 01/29/2019     Priority: Medium     Closed fracture of proximal end of left humerus with routine healing 01/28/2019     Priority: Medium     Acute ST elevation myocardial infarction (STEMI) of inferior wall (H) 01/28/2019     Priority: Medium     Anticoagulation adequate 01/28/2019     Priority: Medium     Lymphedema 01/28/2019     Priority: Medium     Anemia 01/28/2019     Priority: Medium     Acute systolic heart failure (H) 01/28/2019     Priority: Medium     Pain 01/28/2019     Priority: Medium     Rheumatoid arthritis involving multiple sites (H) 01/28/2019     Priority: Medium     Acute blood loss as cause of postoperative anemia 01/18/2019     Priority: Medium     Hypotension due to hypovolemia 01/18/2019     Priority: Medium     S/P reverse total shoulder arthroplasty, left 01/16/2019     Priority: Medium     Prediabetes 01/09/2019     Priority: Medium     Closed fracture of distal end of left humerus with routine healing 01/08/2019     Priority: Medium     Rheumatoid arthritis (H) 08/27/2014     Priority: Medium     Carpal tunnel syndrome, bilateral 08/26/2014     Priority: Medium     Elevated C-reactive protein (CRP) 08/24/2014     Priority: Medium     General weakness 08/24/2014     Priority: Medium      Past Medical History:   Diagnosis Date     ACP (advance care planning) 06/24/2021    Formatting of this note might be different from the original. Full code     Acute anemia 06/24/2021     Acute  blood loss as cause of postoperative anemia 01/18/2019     Acute ST elevation myocardial infarction (STEMI) of inferior wall (H) 01/28/2019     Acute systolic heart failure (H) 01/28/2019     Adjustment insomnia 12/14/2019     Aftercare following surgery of the musculoskeletal system 01/29/2019     Anemia 01/28/2019     Anticoagulation adequate 01/28/2019     Bilateral leg edema 06/24/2021     CAD (coronary artery disease)      Carpal tunnel syndrome, bilateral      Cervical stenosis of spine 06/24/2021     Chronic systolic heart failure (H) 06/24/2021     Closed fracture of distal end of left humerus      Closed fracture of distal end of left humerus with routine healing 01/08/2019     Closed fracture of left proximal humerus 06/29/2021     Closed fracture of neck of right femur (H)      Closed fracture of proximal end of left humerus with routine healing 01/28/2019     Coronary artery disease due to lipid rich plaque 06/24/2021     Dislocation of left shoulder joint      DM2 (diabetes mellitus, type 2) (H)      Elevated C-reactive protein      Elevated C-reactive protein (CRP) 08/24/2014     Essential hypertension 12/14/2019     Fall 06/24/2021     Fracture of neck of right femur (H) 12/14/2019     General weakness 08/24/2014     Hiatal hernia      Hypercholesteremia      Hypotension due to hypovolemia 01/18/2019     Internal derangement of left shoulder      Lower extremity edema      Lumbar stenosis 06/24/2021     Lymphedema 01/28/2019     Pain 01/28/2019     Parkinson disease (H) 06/29/2021    Formatting of this note might be different from the original. sees Neurology with [facility]     Peripheral neuropathy 06/26/2021     Prediabetes      RA (rheumatoid arthritis) (H)      Rheumatoid arthritis (H) 08/27/2014     Rheumatoid arthritis involving multiple sites (H) 01/28/2019     S/P reverse total shoulder arthroplasty, left 01/16/2019     ST elevation myocardial infarction (STEMI) of inferior wall (H)  06/29/2021     STEMI (ST elevation myocardial infarction) (H)      Thrombophlebitis      Type 2 diabetes mellitus without complication, without long-term current use of insulin (H) 04/01/2021     Past Surgical History:   Procedure Laterality Date     ANTERIOR FUSION CERVICAL SPINE  06/24/2021    ERP - ANTERIOR FUSION C4-7, Dr Lang, T     CORONARY STENT PLACEMENT  01/16/2019     HEMIARTHROPLASTY HIP Right 12/08/2019     REVERSE TOTAL SHOULDER ARTHROPLASTY Left 01/16/2019     WISDOM TOOTH EXTRACTION  1966     Current Outpatient Medications   Medication Sig Dispense Refill     acetaminophen (TYLENOL) 500 MG tablet [ACETAMINOPHEN (TYLENOL) 500 MG TABLET] Take 1,000 mg by mouth every 6 (six) hours as needed for pain. Max 4000 mg in 24 hrs       aspirin 81 MG EC tablet [ASPIRIN 81 MG EC TABLET] Take 81 mg by mouth daily. take with meal       atorvastatin (LIPITOR) 40 MG tablet [ATORVASTATIN (LIPITOR) 40 MG TABLET] Take 40 mg by mouth daily.        carbidopa-levodopa (SINEMET)  mg per tablet [CARBIDOPA-LEVODOPA (SINEMET)  MG PER TABLET] Take 1 tablet by mouth 3 (three) times a day.       gabapentin (NEURONTIN) 300 MG capsule [GABAPENTIN (NEURONTIN) 300 MG CAPSULE] Take 300 mg by mouth 3 (three) times a day.       HYDROcodone-acetaminophen 5-325 mg per tablet [HYDROCODONE-ACETAMINOPHEN 5-325 MG PER TABLET] Take 1 tablet by mouth every 4 (four) hours as needed for pain. For moderate pain. Max acetaminophen dose: 4000 mg in 24 hrs.       methocarbamoL (ROBAXIN) 500 MG tablet [METHOCARBAMOL (ROBAXIN) 500 MG TABLET] Take 500 mg by mouth every 6 (six) hours as needed.       metoprolol succinate (TOPROL-XL) 25 MG [METOPROLOL SUCCINATE (TOPROL-XL) 25 MG] Take 12.5 mg by mouth daily.              nitroglycerin (NITROSTAT) 0.4 MG SL tablet [NITROGLYCERIN (NITROSTAT) 0.4 MG SL TABLET] Place 0.4 mg under the tongue every 5 (five) minutes as needed for chest pain. Give 1 tablet sublingually every 5 minutes as needed for  "chest pain       senna-docusate (SENNOSIDES-DOCUSATE SODIUM) 8.6-50 mg tablet [SENNA-DOCUSATE (SENNOSIDES-DOCUSATE SODIUM) 8.6-50 MG TABLET] Take 2 tablets by mouth 2 (two) times a day. Give 2 tablet by mouth two times a day for constipation. Stop if diarrhea         Allergies   Allergen Reactions     Penicillins Hives     Sulfa (Sulfonamide Antibiotics) [Sulfa Drugs] Hives        Social History     Tobacco Use     Smoking status: Former Smoker     Packs/day: 0.10     Years: 35.00     Pack years: 3.50     Types: Cigarettes, Cigarettes     Quit date: 2019     Years since quittin.4     Smokeless tobacco: Never Used   Substance Use Topics     Alcohol use: No       History   Drug Use Not on file         Objective     /69   Pulse 62   Temp 97.7  F (36.5  C)   Resp 17   Ht 1.702 m (5' 7\")   Wt 68.2 kg (150 lb 6.4 oz)   SpO2 99%   BMI 23.56 kg/m      Physical Exam  Vital signs:/69   Pulse 62   Temp 97.7  F (36.5  C)   Resp 17   Ht 1.702 m (5' 7\")   Wt 68.2 kg (150 lb 6.4 oz)   SpO2 99%   BMI 23.56 kg/m     GENERAL APPEARANCE: Well developed, well nourished, in no acute distress.  HEENT: normocephalic, atraumatic   sclerae anicteric, conjunctivae clear and moist, EOM intact, no dentures and teeth in good condition  LUNGS: Lung sounds CTA, no adventitious sounds, respiratory effort normal.  CARD: RRR, S1, S2, without murmurs, gallops, rubs,   ABD: Soft, nondistended and nontender with normal bowel sounds.   MSK: Muscle strength and tone were equal bilaterally. Moves all extremities easily and intentionally.   EXTREMITIES: No cyanosis, clubbing or edema.  NEURO: Alert and oriented x 3. Face is symmetric.  SKIN: Inspection of the skin reveals no rashes, ulcerations or petechiae.  PSYCH: euthymic          Recent Labs   Lab Test 21  0846 19  1133 19  0717   HGB 10.8* 10.7* 10.3*   PLT  --  345 323   NA  --  137 139   POTASSIUM  --  4.1 4.4   CR  --  0.73 0.71    "     Diagnostics:  Labs to be drawn on 7/19 per surgery.  Results will be reviewed when available.   EKG required for known coronary heart disease and not completed in the last 90 days.     Revised Cardiac Risk Index (RCRI):  The patient has the following serious cardiovascular risks for perioperative complications:   - Coronary Artery Disease (MI, positive stress test, angina, Qs on EKG) = 1 point     RCRI Interpretation: 1 point: Class II (low risk - 0.9% complication rate)    Pulmonary risk: ARISCAT: 13.3% risk (intermediate risk) and should be monitored closely.        Signed Electronically by: Jane Domínguez NP  Copy of this evaluation report is provided to requesting physician.

## 2021-07-16 NOTE — LETTER
7/16/2021        RE: Hugh Berman Jr.  [address]  [facility] [address]        [facility]  [address] [place] [address]  Phone: [phone]  Fax: [phone]  Primary Provider: Ar Alanis    6}  PREOPERATIVE EVALUATION:  Today's date: 7/16/2021    Hugh Berman Jr. is a 76 year old male who presents for a preoperative evaluation.He has past medical history for STEMI, CAD, heart failure, type 2 diabetes, hypertension, Parkinson's disease.  Was recently hospitalized 6/19-6/26 after a fall determined to be mechanical.  He was found to have a C4-7 ACDF and underwent decompression on 6/24 and placed in a [place] collar.  He does have cervical stenosis and lumbar stenosis leading to chronic pain. He is being evaluated for risk stratification for another procedure involving surgery for C5-C7 fusion.      Surgical Information:  Surgery/Procedure: C5-C7 fusion  Surgery Location: [facility]  Surgeon: Dr. Lang  Surgery Date: 7/22/2021    Where patient plans to recover: At home with family      Type of Anesthesia Anticipated: General    Assessment & Plan     The proposed surgical procedure is considered {HIGH=major cardiovascular or procedures requiring prolonged anesthesia >4 hours or large fluid shifts;     Possible Sleep Apnea:        Risks and Recommendations:  The patient has the following additional risks and recommendations for perioperative complications:  Cardiovascular:   - history of STEMI, EKG ordered    Medication Instructions:   - aspirin: Discontinue aspirin 7-10 days prior to procedure to reduce bleeding risk. It should be resumed postoperatively. hold starting 7/12    RECOMMENDATION:  APPROVAL GIVEN to proceed with proposed procedure pending review of diagnostic evaluation.  [id number]}    Subjective         Health Care Directive:  Patient does not have a Health Care Directive or Living Will: Patient states has Advance Directive and will bring  in a copy to clinic.  [id number]}    Status of Chronic Conditions:  ANEMIA - Patient has a recent history of moderate-severe anemia, which has not been symptomatic. Work up to date has revealed last hgb 10.8, likely chronic disease.     Diabetes: stable on no medications    HTN: stable without medications    Hx of STEMI: angina stable, has prn nitroglycerin, on asa and lipitor    Parkinsons: improving with recent increase in Sinemet    No history of bleeding or clots.  Currently no fever, chills, chest pain, viral symptoms    Review of Systems  Patient denies fever, chills, headache, lightheadedness, dizziness, rhinorrhea, cough, congestion, shortness of breath, chest pain, palpitations, abdominal pain, n/v, diarrhea, constipation, change in appetite, change in sleep pattern, dysuria, frequency, burning or pain with urination.  Other than stated in HPI all other review of systems is negative.         Patient Active Problem List    Diagnosis Date Noted     Internal derangement of left shoulder 06/29/2021     Priority: Medium     Parkinson disease (H) 06/29/2021     Priority: Medium     Formatting of this note might be different from the original.  sees Neurology with [facility]         ST elevation myocardial infarction (STEMI) of inferior wall (H) 06/29/2021     Priority: Medium     Closed fracture of left proximal humerus 06/29/2021     Priority: Medium     Peripheral neuropathy 06/26/2021     Priority: Medium     ACP (advance care planning) 06/24/2021     Priority: Medium     Formatting of this note might be different from the original.  Full code         Bilateral leg edema 06/24/2021     Priority: Medium     Cervical stenosis of spine 06/24/2021     Priority: Medium     Lumbar stenosis 06/24/2021     Priority: Medium     Coronary artery disease of native artery of native heart with stable angina pectoris (H) 06/24/2021     Priority: Medium     Fall 06/24/2021     Priority: Medium     Chronic systolic heart failure  (H) 06/24/2021     Priority: Medium     Acute anemia 06/24/2021     Priority: Medium     Type 2 diabetes mellitus without complication, without long-term current use of insulin (H) 04/01/2021     Priority: Medium     Fracture of neck of right femur (H) 12/14/2019     Priority: Medium     Essential hypertension 12/14/2019     Priority: Medium     Adjustment insomnia 12/14/2019     Priority: Medium     Closed fracture of neck of right femur (H) 12/07/2019     Priority: Medium     Dislocation of left shoulder joint 02/05/2019     Priority: Medium     Aftercare following surgery of the musculoskeletal system 01/29/2019     Priority: Medium     Closed fracture of proximal end of left humerus with routine healing 01/28/2019     Priority: Medium     Acute ST elevation myocardial infarction (STEMI) of inferior wall (H) 01/28/2019     Priority: Medium     Anticoagulation adequate 01/28/2019     Priority: Medium     Lymphedema 01/28/2019     Priority: Medium     Anemia 01/28/2019     Priority: Medium     Acute systolic heart failure (H) 01/28/2019     Priority: Medium     Pain 01/28/2019     Priority: Medium     Rheumatoid arthritis involving multiple sites (H) 01/28/2019     Priority: Medium     Acute blood loss as cause of postoperative anemia 01/18/2019     Priority: Medium     Hypotension due to hypovolemia 01/18/2019     Priority: Medium     S/P reverse total shoulder arthroplasty, left 01/16/2019     Priority: Medium     Prediabetes 01/09/2019     Priority: Medium     Closed fracture of distal end of left humerus with routine healing 01/08/2019     Priority: Medium     Rheumatoid arthritis (H) 08/27/2014     Priority: Medium     Carpal tunnel syndrome, bilateral 08/26/2014     Priority: Medium     Elevated C-reactive protein (CRP) 08/24/2014     Priority: Medium     General weakness 08/24/2014     Priority: Medium      Past Medical History:   Diagnosis Date     ACP (advance care planning) 06/24/2021    Formatting of  this note might be different from the original. Full code     Acute anemia 06/24/2021     Acute blood loss as cause of postoperative anemia 01/18/2019     Acute ST elevation myocardial infarction (STEMI) of inferior wall (H) 01/28/2019     Acute systolic heart failure (H) 01/28/2019     Adjustment insomnia 12/14/2019     Aftercare following surgery of the musculoskeletal system 01/29/2019     Anemia 01/28/2019     Anticoagulation adequate 01/28/2019     Bilateral leg edema 06/24/2021     CAD (coronary artery disease)      Carpal tunnel syndrome, bilateral      Cervical stenosis of spine 06/24/2021     Chronic systolic heart failure (H) 06/24/2021     Closed fracture of distal end of left humerus      Closed fracture of distal end of left humerus with routine healing 01/08/2019     Closed fracture of left proximal humerus 06/29/2021     Closed fracture of neck of right femur (H)      Closed fracture of proximal end of left humerus with routine healing 01/28/2019     Coronary artery disease due to lipid rich plaque 06/24/2021     Dislocation of left shoulder joint      DM2 (diabetes mellitus, type 2) (H)      Elevated C-reactive protein      Elevated C-reactive protein (CRP) 08/24/2014     Essential hypertension 12/14/2019     Fall 06/24/2021     Fracture of neck of right femur (H) 12/14/2019     General weakness 08/24/2014     Hiatal hernia      Hypercholesteremia      Hypotension due to hypovolemia 01/18/2019     Internal derangement of left shoulder      Lower extremity edema      Lumbar stenosis 06/24/2021     Lymphedema 01/28/2019     Pain 01/28/2019     Parkinson disease (H) 06/29/2021    Formatting of this note might be different from the original. sees Neurology with [facility]     Peripheral neuropathy 06/26/2021     Prediabetes      RA (rheumatoid arthritis) (H)      Rheumatoid arthritis (H) 08/27/2014     Rheumatoid arthritis involving multiple sites (H) 01/28/2019     S/P reverse total shoulder  arthroplasty, left 01/16/2019     ST elevation myocardial infarction (STEMI) of inferior wall (H) 06/29/2021     STEMI (ST elevation myocardial infarction) (H)      Thrombophlebitis      Type 2 diabetes mellitus without complication, without long-term current use of insulin (H) 04/01/2021     Past Surgical History:   Procedure Laterality Date     ANTERIOR FUSION CERVICAL SPINE  06/24/2021    ERP - ANTERIOR FUSION C4-7, Dr Lang, T     CORONARY STENT PLACEMENT  01/16/2019     HEMIARTHROPLASTY HIP Right 12/08/2019     REVERSE TOTAL SHOULDER ARTHROPLASTY Left 01/16/2019     WISDOM TOOTH EXTRACTION  1966     Current Outpatient Medications   Medication Sig Dispense Refill     acetaminophen (TYLENOL) 500 MG tablet [ACETAMINOPHEN (TYLENOL) 500 MG TABLET] Take 1,000 mg by mouth every 6 (six) hours as needed for pain. Max 4000 mg in 24 hrs       aspirin 81 MG EC tablet [ASPIRIN 81 MG EC TABLET] Take 81 mg by mouth daily. take with meal       atorvastatin (LIPITOR) 40 MG tablet [ATORVASTATIN (LIPITOR) 40 MG TABLET] Take 40 mg by mouth daily.        carbidopa-levodopa (SINEMET)  mg per tablet [CARBIDOPA-LEVODOPA (SINEMET)  MG PER TABLET] Take 1 tablet by mouth 3 (three) times a day.       gabapentin (NEURONTIN) 300 MG capsule [GABAPENTIN (NEURONTIN) 300 MG CAPSULE] Take 300 mg by mouth 3 (three) times a day.       HYDROcodone-acetaminophen 5-325 mg per tablet [HYDROCODONE-ACETAMINOPHEN 5-325 MG PER TABLET] Take 1 tablet by mouth every 4 (four) hours as needed for pain. For moderate pain. Max acetaminophen dose: 4000 mg in 24 hrs.       methocarbamoL (ROBAXIN) 500 MG tablet [METHOCARBAMOL (ROBAXIN) 500 MG TABLET] Take 500 mg by mouth every 6 (six) hours as needed.       metoprolol succinate (TOPROL-XL) 25 MG [METOPROLOL SUCCINATE (TOPROL-XL) 25 MG] Take 12.5 mg by mouth daily.              nitroglycerin (NITROSTAT) 0.4 MG SL tablet [NITROGLYCERIN (NITROSTAT) 0.4 MG SL TABLET] Place 0.4 mg under the tongue every 5  "(five) minutes as needed for chest pain. Give 1 tablet sublingually every 5 minutes as needed for chest pain       senna-docusate (SENNOSIDES-DOCUSATE SODIUM) 8.6-50 mg tablet [SENNA-DOCUSATE (SENNOSIDES-DOCUSATE SODIUM) 8.6-50 MG TABLET] Take 2 tablets by mouth 2 (two) times a day. Give 2 tablet by mouth two times a day for constipation. Stop if diarrhea         Allergies   Allergen Reactions     Penicillins Hives     Sulfa (Sulfonamide Antibiotics) [Sulfa Drugs] Hives        Social History     Tobacco Use     Smoking status: Former Smoker     Packs/day: 0.10     Years: 35.00     Pack years: 3.50     Types: Cigarettes, Cigarettes     Quit date: 2019     Years since quittin.4     Smokeless tobacco: Never Used   Substance Use Topics     Alcohol use: No       History   Drug Use Not on file         Objective     /69   Pulse 62   Temp 97.7  F (36.5  C)   Resp 17   Ht 1.702 m (5' 7\")   Wt 68.2 kg (150 lb 6.4 oz)   SpO2 99%   BMI 23.56 kg/m      Physical Exam  Vital signs:/69   Pulse 62   Temp 97.7  F (36.5  C)   Resp 17   Ht 1.702 m (5' 7\")   Wt 68.2 kg (150 lb 6.4 oz)   SpO2 99%   BMI 23.56 kg/m     GENERAL APPEARANCE: Well developed, well nourished, in no acute distress.  HEENT: normocephalic, atraumatic   sclerae anicteric, conjunctivae clear and moist, EOM intact, no dentures and teeth in good condition  LUNGS: Lung sounds CTA, no adventitious sounds, respiratory effort normal.  CARD: RRR, S1, S2, without murmurs, gallops, rubs,   ABD: Soft, nondistended and nontender with normal bowel sounds.   MSK: Muscle strength and tone were equal bilaterally. Moves all extremities easily and intentionally.   EXTREMITIES: No cyanosis, clubbing or edema.  NEURO: Alert and oriented x 3. Face is symmetric.  SKIN: Inspection of the skin reveals no rashes, ulcerations or petechiae.  PSYCH: euthymic          Recent Labs   Lab Test 21  0846 19  1133 19  0717   HGB 10.8* 10.7* 10.3* "   PLT  --  345 323   NA  --  137 139   POTASSIUM  --  4.1 4.4   CR  --  0.73 0.71        Diagnostics:  Labs to be drawn on 7/19 per surgery.  Results will be reviewed when available.   EKG required for known coronary heart disease and not completed in the last 90 days.     Revised Cardiac Risk Index (RCRI):  The patient has the following serious cardiovascular risks for perioperative complications:   - Coronary Artery Disease (MI, positive stress test, angina, Qs on EKG) = 1 point     RCRI Interpretation: 1 point: Class II (low risk - 0.9% complication rate)    Pulmonary risk: ARISCAT: 13.3% risk (intermediate risk) and should be monitored closely.        Signed Electronically by: Jane Domínguez NP  Copy of this evaluation report is provided to requesting physician.              Sincerely,        Jane Domínguez NP

## 2021-07-16 NOTE — LETTER
7/16/2021        RE: Hugh Berman Jr.  [address]  [facility] [address]        [facility]  [address] [place] [address]  Phone: [phone]  Fax: [phone]  Primary Provider: Ar Alanis    6}  PREOPERATIVE EVALUATION:  Today's date: 7/16/2021    Hugh Berman Jr. is a 76 year old male who presents for a preoperative evaluation.He has past medical history for STEMI, CAD, heart failure, type 2 diabetes, hypertension, Parkinson's disease.  Was recently hospitalized 6/19-6/26 after a fall determined to be mechanical.  He was found to have a C4-7 ACDF and underwent decompression on 6/24 and placed in a [place] J collar.  He does have cervical stenosis and lumbar stenosis leading to chronic pain. He is being evaluated for risk stratification for another procedure involving surgery for C5-C7 fusion.      Surgical Information:  Surgery/Procedure: C5-C7 fusion  Surgery Location: [facility]  Surgeon: Dr. Lang  Surgery Date: 7/22/2021    Where patient plans to recover: At home with family      Type of Anesthesia Anticipated: General    Assessment & Plan     The proposed surgical procedure is considered {HIGH=major cardiovascular or procedures requiring prolonged anesthesia >4 hours or large fluid shifts;     Possible Sleep Apnea:        Risks and Recommendations:  The patient has the following additional risks and recommendations for perioperative complications:  Cardiovascular:   - history of STEMI, EKG ordered    Medication Instructions:   - aspirin: Discontinue aspirin 7-10 days prior to procedure to reduce bleeding risk. It should be resumed postoperatively. hold starting 7/12    RECOMMENDATION:  APPROVAL GIVEN to proceed with proposed procedure pending review of diagnostic evaluation.  [id number]}    Subjective         Health Care Directive:  Patient does not have a Health Care Directive or Living Will: Patient states has Advance Directive and will bring  in a copy to clinic.  [id number]}    Status of Chronic Conditions:  ANEMIA - Patient has a recent history of moderate-severe anemia, which has not been symptomatic. Work up to date has revealed last hgb 10.8, likely chronic disease.     Diabetes: stable on no medications    HTN: stable without medications    Hx of STEMI: angina stable, has prn nitroglycerin, on asa and lipitor    Parkinsons: improving with recent increase in Sinemet    No history of bleeding or clots.  Currently no fever, chills, chest pain, viral symptoms    Review of Systems  Patient denies fever, chills, headache, lightheadedness, dizziness, rhinorrhea, cough, congestion, shortness of breath, chest pain, palpitations, abdominal pain, n/v, diarrhea, constipation, change in appetite, change in sleep pattern, dysuria, frequency, burning or pain with urination.  Other than stated in HPI all other review of systems is negative.         Patient Active Problem List    Diagnosis Date Noted     Internal derangement of left shoulder 06/29/2021     Priority: Medium     Parkinson disease (H) 06/29/2021     Priority: Medium     Formatting of this note might be different from the original.  sees Neurology with [facility]         ST elevation myocardial infarction (STEMI) of inferior wall (H) 06/29/2021     Priority: Medium     Closed fracture of left proximal humerus 06/29/2021     Priority: Medium     Peripheral neuropathy 06/26/2021     Priority: Medium     ACP (advance care planning) 06/24/2021     Priority: Medium     Formatting of this note might be different from the original.  Full code         Bilateral leg edema 06/24/2021     Priority: Medium     Cervical stenosis of spine 06/24/2021     Priority: Medium     Lumbar stenosis 06/24/2021     Priority: Medium     Coronary artery disease of native artery of native heart with stable angina pectoris (H) 06/24/2021     Priority: Medium     Fall 06/24/2021     Priority: Medium     Chronic systolic heart failure  (H) 06/24/2021     Priority: Medium     Acute anemia 06/24/2021     Priority: Medium     Type 2 diabetes mellitus without complication, without long-term current use of insulin (H) 04/01/2021     Priority: Medium     Fracture of neck of right femur (H) 12/14/2019     Priority: Medium     Essential hypertension 12/14/2019     Priority: Medium     Adjustment insomnia 12/14/2019     Priority: Medium     Closed fracture of neck of right femur (H) 12/07/2019     Priority: Medium     Dislocation of left shoulder joint 02/05/2019     Priority: Medium     Aftercare following surgery of the musculoskeletal system 01/29/2019     Priority: Medium     Closed fracture of proximal end of left humerus with routine healing 01/28/2019     Priority: Medium     Acute ST elevation myocardial infarction (STEMI) of inferior wall (H) 01/28/2019     Priority: Medium     Anticoagulation adequate 01/28/2019     Priority: Medium     Lymphedema 01/28/2019     Priority: Medium     Anemia 01/28/2019     Priority: Medium     Acute systolic heart failure (H) 01/28/2019     Priority: Medium     Pain 01/28/2019     Priority: Medium     Rheumatoid arthritis involving multiple sites (H) 01/28/2019     Priority: Medium     Acute blood loss as cause of postoperative anemia 01/18/2019     Priority: Medium     Hypotension due to hypovolemia 01/18/2019     Priority: Medium     S/P reverse total shoulder arthroplasty, left 01/16/2019     Priority: Medium     Prediabetes 01/09/2019     Priority: Medium     Closed fracture of distal end of left humerus with routine healing 01/08/2019     Priority: Medium     Rheumatoid arthritis (H) 08/27/2014     Priority: Medium     Carpal tunnel syndrome, bilateral 08/26/2014     Priority: Medium     Elevated C-reactive protein (CRP) 08/24/2014     Priority: Medium     General weakness 08/24/2014     Priority: Medium      Past Medical History:   Diagnosis Date     ACP (advance care planning) 06/24/2021    Formatting of  this note might be different from the original. Full code     Acute anemia 06/24/2021     Acute blood loss as cause of postoperative anemia 01/18/2019     Acute ST elevation myocardial infarction (STEMI) of inferior wall (H) 01/28/2019     Acute systolic heart failure (H) 01/28/2019     Adjustment insomnia 12/14/2019     Aftercare following surgery of the musculoskeletal system 01/29/2019     Anemia 01/28/2019     Anticoagulation adequate 01/28/2019     Bilateral leg edema 06/24/2021     CAD (coronary artery disease)      Carpal tunnel syndrome, bilateral      Cervical stenosis of spine 06/24/2021     Chronic systolic heart failure (H) 06/24/2021     Closed fracture of distal end of left humerus      Closed fracture of distal end of left humerus with routine healing 01/08/2019     Closed fracture of left proximal humerus 06/29/2021     Closed fracture of neck of right femur (H)      Closed fracture of proximal end of left humerus with routine healing 01/28/2019     Coronary artery disease due to lipid rich plaque 06/24/2021     Dislocation of left shoulder joint      DM2 (diabetes mellitus, type 2) (H)      Elevated C-reactive protein      Elevated C-reactive protein (CRP) 08/24/2014     Essential hypertension 12/14/2019     Fall 06/24/2021     Fracture of neck of right femur (H) 12/14/2019     General weakness 08/24/2014     Hiatal hernia      Hypercholesteremia      Hypotension due to hypovolemia 01/18/2019     Internal derangement of left shoulder      Lower extremity edema      Lumbar stenosis 06/24/2021     Lymphedema 01/28/2019     Pain 01/28/2019     Parkinson disease (H) 06/29/2021    Formatting of this note might be different from the original. sees Neurology with [facility]     Peripheral neuropathy 06/26/2021     Prediabetes      RA (rheumatoid arthritis) (H)      Rheumatoid arthritis (H) 08/27/2014     Rheumatoid arthritis involving multiple sites (H) 01/28/2019     S/P reverse total shoulder  arthroplasty, left 01/16/2019     ST elevation myocardial infarction (STEMI) of inferior wall (H) 06/29/2021     STEMI (ST elevation myocardial infarction) (H)      Thrombophlebitis      Type 2 diabetes mellitus without complication, without long-term current use of insulin (H) 04/01/2021     Past Surgical History:   Procedure Laterality Date     ANTERIOR FUSION CERVICAL SPINE  06/24/2021    ERP - ANTERIOR FUSION C4-7, Dr Lang, T     CORONARY STENT PLACEMENT  01/16/2019     HEMIARTHROPLASTY HIP Right 12/08/2019     REVERSE TOTAL SHOULDER ARTHROPLASTY Left 01/16/2019     WISDOM TOOTH EXTRACTION  1966     Current Outpatient Medications   Medication Sig Dispense Refill     acetaminophen (TYLENOL) 500 MG tablet [ACETAMINOPHEN (TYLENOL) 500 MG TABLET] Take 1,000 mg by mouth every 6 (six) hours as needed for pain. Max 4000 mg in 24 hrs       aspirin 81 MG EC tablet [ASPIRIN 81 MG EC TABLET] Take 81 mg by mouth daily. take with meal       atorvastatin (LIPITOR) 40 MG tablet [ATORVASTATIN (LIPITOR) 40 MG TABLET] Take 40 mg by mouth daily.        carbidopa-levodopa (SINEMET)  mg per tablet [CARBIDOPA-LEVODOPA (SINEMET)  MG PER TABLET] Take 1 tablet by mouth 3 (three) times a day.       gabapentin (NEURONTIN) 300 MG capsule [GABAPENTIN (NEURONTIN) 300 MG CAPSULE] Take 300 mg by mouth 3 (three) times a day.       HYDROcodone-acetaminophen 5-325 mg per tablet [HYDROCODONE-ACETAMINOPHEN 5-325 MG PER TABLET] Take 1 tablet by mouth every 4 (four) hours as needed for pain. For moderate pain. Max acetaminophen dose: 4000 mg in 24 hrs.       methocarbamoL (ROBAXIN) 500 MG tablet [METHOCARBAMOL (ROBAXIN) 500 MG TABLET] Take 500 mg by mouth every 6 (six) hours as needed.       metoprolol succinate (TOPROL-XL) 25 MG [METOPROLOL SUCCINATE (TOPROL-XL) 25 MG] Take 12.5 mg by mouth daily.              nitroglycerin (NITROSTAT) 0.4 MG SL tablet [NITROGLYCERIN (NITROSTAT) 0.4 MG SL TABLET] Place 0.4 mg under the tongue every 5  "(five) minutes as needed for chest pain. Give 1 tablet sublingually every 5 minutes as needed for chest pain       senna-docusate (SENNOSIDES-DOCUSATE SODIUM) 8.6-50 mg tablet [SENNA-DOCUSATE (SENNOSIDES-DOCUSATE SODIUM) 8.6-50 MG TABLET] Take 2 tablets by mouth 2 (two) times a day. Give 2 tablet by mouth two times a day for constipation. Stop if diarrhea         Allergies   Allergen Reactions     Penicillins Hives     Sulfa (Sulfonamide Antibiotics) [Sulfa Drugs] Hives        Social History     Tobacco Use     Smoking status: Former Smoker     Packs/day: 0.10     Years: 35.00     Pack years: 3.50     Types: Cigarettes, Cigarettes     Quit date: 2019     Years since quittin.4     Smokeless tobacco: Never Used   Substance Use Topics     Alcohol use: No       History   Drug Use Not on file         Objective     /69   Pulse 62   Temp 97.7  F (36.5  C)   Resp 17   Ht 1.702 m (5' 7\")   Wt 68.2 kg (150 lb 6.4 oz)   SpO2 99%   BMI 23.56 kg/m      Physical Exam  Vital signs:/69   Pulse 62   Temp 97.7  F (36.5  C)   Resp 17   Ht 1.702 m (5' 7\")   Wt 68.2 kg (150 lb 6.4 oz)   SpO2 99%   BMI 23.56 kg/m     GENERAL APPEARANCE: Well developed, well nourished, in no acute distress.  HEENT: normocephalic, atraumatic   sclerae anicteric, conjunctivae clear and moist, EOM intact, no dentures and teeth in good condition  LUNGS: Lung sounds CTA, no adventitious sounds, respiratory effort normal.  CARD: RRR, S1, S2, without murmurs, gallops, rubs,   ABD: Soft, nondistended and nontender with normal bowel sounds.   MSK: Muscle strength and tone were equal bilaterally. Moves all extremities easily and intentionally.   EXTREMITIES: No cyanosis, clubbing or edema.  NEURO: Alert and oriented x 3. Face is symmetric.  SKIN: Inspection of the skin reveals no rashes, ulcerations or petechiae.  PSYCH: euthymic          Recent Labs   Lab Test 21  0846 19  1133 19  0717   HGB 10.8* 10.7* 10.3* "   PLT  --  345 323   NA  --  137 139   POTASSIUM  --  4.1 4.4   CR  --  0.73 0.71        Diagnostics:  Labs to be drawn on 7/19 per surgery.  Results will be reviewed when available.   EKG required for known coronary heart disease and not completed in the last 90 days.     Revised Cardiac Risk Index (RCRI):  The patient has the following serious cardiovascular risks for perioperative complications:   - Coronary Artery Disease (MI, positive stress test, angina, Qs on EKG) = 1 point     RCRI Interpretation: 1 point: Class II (low risk - 0.9% complication rate)    Pulmonary risk: ARISCAT: 13.3% risk (intermediate risk) and should be monitored closely.        Signed Electronically by: Jane Domínguez NP  Copy of this evaluation report is provided to requesting physician.              Sincerely,        Jane Domínguez NP

## 2021-07-17 ENCOUNTER — LAB REQUISITION (OUTPATIENT)
Dept: LAB | Facility: CLINIC | Age: 76
End: 2021-07-17
Payer: MEDICARE

## 2021-07-17 DIAGNOSIS — I25.5 ISCHEMIC CARDIOMYOPATHY: ICD-10-CM

## 2021-07-17 DIAGNOSIS — M43.22 FUSION OF SPINE, CERVICAL REGION: ICD-10-CM

## 2021-07-19 ENCOUNTER — TELEPHONE (OUTPATIENT)
Dept: GERIATRICS | Facility: CLINIC | Age: 76
End: 2021-07-19

## 2021-07-19 LAB
ERYTHROCYTE [DISTWIDTH] IN BLOOD BY AUTOMATED COUNT: 13.3 % (ref 10–15)
HCT VFR BLD AUTO: 33.4 % (ref 40–53)
HGB BLD-MCNC: 10.4 G/DL (ref 13.3–17.7)
MCH RBC QN AUTO: 31.3 PG (ref 26.5–33)
MCHC RBC AUTO-ENTMCNC: 31.1 G/DL (ref 31.5–36.5)
MCV RBC AUTO: 101 FL (ref 78–100)
PLATELET # BLD AUTO: 218 10E3/UL (ref 150–450)
POTASSIUM BLD-SCNC: 3.9 MMOL/L (ref 3.5–5)
RBC # BLD AUTO: 3.32 10E6/UL (ref 4.4–5.9)
WBC # BLD AUTO: 3.3 10E3/UL (ref 4–11)

## 2021-07-19 PROCEDURE — 36415 COLL VENOUS BLD VENIPUNCTURE: CPT | Performed by: ORTHOPAEDIC SURGERY

## 2021-07-19 PROCEDURE — 85027 COMPLETE CBC AUTOMATED: CPT | Performed by: ORTHOPAEDIC SURGERY

## 2021-07-19 PROCEDURE — 84132 ASSAY OF SERUM POTASSIUM: CPT | Performed by: ORTHOPAEDIC SURGERY

## 2021-07-19 PROCEDURE — 85014 HEMATOCRIT: CPT | Performed by: ORTHOPAEDIC SURGERY

## 2021-07-19 NOTE — TELEPHONE ENCOUNTER
[facility] Nurse Triage Telephone Note    Provider: Ayala Amezcua MD  Facility: [facility] Facility Type:  TCU    Caller: Latanya  Call Back Number: [phone]    Allergies:    Allergies   Allergen Reactions     Penicillins Hives     Sulfa (Sulfonamide Antibiotics) [Sulfa Drugs] Hives        Reason for call: Nurse reporting Heme 2 and potassium levels.  These were done as part of a pre-op for spine surgery on 7/22/21.      Verbal Order/Direction given by Provider: Update surgeon with the lab results.      Provider giving Order:  GABE De Paz    Verbal Order given to: Latanya Olivo RN

## 2022-10-20 ENCOUNTER — LAB REQUISITION (OUTPATIENT)
Dept: LAB | Facility: CLINIC | Age: 77
End: 2022-10-20
Payer: MEDICARE

## 2022-10-20 DIAGNOSIS — R79.89 OTHER SPECIFIED ABNORMAL FINDINGS OF BLOOD CHEMISTRY: ICD-10-CM

## 2022-10-26 LAB
ANION GAP SERPL CALCULATED.3IONS-SCNC: 12 MMOL/L (ref 7–15)
BUN SERPL-MCNC: 26.8 MG/DL (ref 8–23)
CALCIUM SERPL-MCNC: 8.8 MG/DL (ref 8.8–10.2)
CHLORIDE SERPL-SCNC: 101 MMOL/L (ref 98–107)
CREAT SERPL-MCNC: 0.46 MG/DL (ref 0.67–1.17)
DEPRECATED HCO3 PLAS-SCNC: 25 MMOL/L (ref 22–29)
GFR SERPL CREATININE-BSD FRML MDRD: >90 ML/MIN/1.73M2
GLUCOSE SERPL-MCNC: 141 MG/DL (ref 70–99)
POTASSIUM SERPL-SCNC: 4.2 MMOL/L (ref 3.4–5.3)
SODIUM SERPL-SCNC: 138 MMOL/L (ref 136–145)

## 2022-10-26 PROCEDURE — 36415 COLL VENOUS BLD VENIPUNCTURE: CPT | Performed by: FAMILY MEDICINE

## 2022-10-26 PROCEDURE — P9604 ONE-WAY ALLOW PRORATED TRIP: HCPCS | Performed by: FAMILY MEDICINE

## 2022-10-26 PROCEDURE — 80048 BASIC METABOLIC PNL TOTAL CA: CPT | Performed by: FAMILY MEDICINE

## 2023-01-27 ENCOUNTER — LAB REQUISITION (OUTPATIENT)
Dept: LAB | Facility: CLINIC | Age: 78
End: 2023-01-27
Payer: MEDICARE

## 2023-01-27 DIAGNOSIS — D51.0 VITAMIN B12 DEFICIENCY ANEMIA DUE TO INTRINSIC FACTOR DEFICIENCY: ICD-10-CM

## 2023-01-27 DIAGNOSIS — E55.9 VITAMIN D DEFICIENCY, UNSPECIFIED: ICD-10-CM

## 2023-01-30 LAB
DEPRECATED CALCIDIOL+CALCIFEROL SERPL-MC: 24 UG/L (ref 20–75)
VIT B12 SERPL-MCNC: 437 PG/ML (ref 232–1245)

## 2023-01-30 PROCEDURE — 82306 VITAMIN D 25 HYDROXY: CPT | Mod: ORL | Performed by: FAMILY MEDICINE

## 2023-01-30 PROCEDURE — P9604 ONE-WAY ALLOW PRORATED TRIP: HCPCS | Mod: ORL | Performed by: FAMILY MEDICINE

## 2023-01-30 PROCEDURE — 82607 VITAMIN B-12: CPT | Mod: ORL | Performed by: FAMILY MEDICINE

## 2023-01-30 PROCEDURE — 36415 COLL VENOUS BLD VENIPUNCTURE: CPT | Mod: ORL | Performed by: FAMILY MEDICINE
